# Patient Record
Sex: FEMALE | Race: WHITE | NOT HISPANIC OR LATINO | Employment: UNEMPLOYED | URBAN - METROPOLITAN AREA
[De-identification: names, ages, dates, MRNs, and addresses within clinical notes are randomized per-mention and may not be internally consistent; named-entity substitution may affect disease eponyms.]

---

## 2018-01-12 ENCOUNTER — CONVERSION ENCOUNTER (OUTPATIENT)
Dept: INTERNAL MEDICINE | Facility: CLINIC | Age: 33
End: 2018-01-12

## 2018-01-12 ENCOUNTER — OFFICE VISIT CONVERTED (OUTPATIENT)
Dept: INTERNAL MEDICINE | Facility: CLINIC | Age: 33
End: 2018-01-12
Attending: NURSE PRACTITIONER

## 2018-05-07 ENCOUNTER — OFFICE VISIT CONVERTED (OUTPATIENT)
Dept: INTERNAL MEDICINE | Facility: CLINIC | Age: 33
End: 2018-05-07
Attending: NURSE PRACTITIONER

## 2021-05-16 VITALS
RESPIRATION RATE: 16 BRPM | TEMPERATURE: 98.5 F | DIASTOLIC BLOOD PRESSURE: 68 MMHG | WEIGHT: 263.25 LBS | BODY MASS INDEX: 43.86 KG/M2 | HEART RATE: 94 BPM | OXYGEN SATURATION: 99 % | HEIGHT: 65 IN | SYSTOLIC BLOOD PRESSURE: 128 MMHG

## 2021-05-16 VITALS
HEIGHT: 65 IN | SYSTOLIC BLOOD PRESSURE: 138 MMHG | TEMPERATURE: 97.7 F | DIASTOLIC BLOOD PRESSURE: 90 MMHG | RESPIRATION RATE: 15 BRPM | WEIGHT: 267.37 LBS | HEART RATE: 105 BPM | OXYGEN SATURATION: 98 % | BODY MASS INDEX: 44.55 KG/M2

## 2022-09-08 ENCOUNTER — HOSPITAL ENCOUNTER (EMERGENCY)
Facility: HOSPITAL | Age: 37
Discharge: LEFT BEFORE DISCHARGE | End: 2022-09-08

## 2022-09-08 PROCEDURE — 99211 OFF/OP EST MAY X REQ PHY/QHP: CPT

## 2022-11-14 ENCOUNTER — TRANSCRIBE ORDERS (OUTPATIENT)
Dept: ADMINISTRATIVE | Facility: HOSPITAL | Age: 37
End: 2022-11-14

## 2022-11-14 DIAGNOSIS — G35 MULTIPLE SCLEROSIS: Primary | ICD-10-CM

## 2022-11-15 ENCOUNTER — APPOINTMENT (OUTPATIENT)
Dept: INFUSION THERAPY | Facility: HOSPITAL | Age: 37
End: 2022-11-15

## 2022-11-15 ENCOUNTER — HOSPITAL ENCOUNTER (OUTPATIENT)
Dept: INFUSION THERAPY | Facility: HOSPITAL | Age: 37
Setting detail: INFUSION SERIES
Discharge: HOME | End: 2022-11-15

## 2022-11-15 VITALS
BODY MASS INDEX: 39.37 KG/M2 | HEIGHT: 64 IN | HEART RATE: 91 BPM | DIASTOLIC BLOOD PRESSURE: 109 MMHG | TEMPERATURE: 98.7 F | WEIGHT: 230.6 LBS | OXYGEN SATURATION: 99 % | SYSTOLIC BLOOD PRESSURE: 168 MMHG | RESPIRATION RATE: 16 BRPM

## 2022-11-15 DIAGNOSIS — G35 MULTIPLE SCLEROSIS: Primary | ICD-10-CM

## 2022-11-15 PROCEDURE — 96365 THER/PROPH/DIAG IV INF INIT: CPT

## 2022-11-15 PROCEDURE — 0 METHYLPREDNISOLONE PER 125 MG: Performed by: PSYCHIATRY & NEUROLOGY

## 2022-11-15 RX ADMIN — SODIUM CHLORIDE 1000 MG: 9 INJECTION, SOLUTION INTRAVENOUS at 17:05

## 2022-11-16 ENCOUNTER — APPOINTMENT (OUTPATIENT)
Dept: INFUSION THERAPY | Facility: HOSPITAL | Age: 37
End: 2022-11-16

## 2022-11-16 ENCOUNTER — HOSPITAL ENCOUNTER (OUTPATIENT)
Dept: INFUSION THERAPY | Facility: HOSPITAL | Age: 37
Setting detail: INFUSION SERIES
Discharge: HOME | End: 2022-11-16

## 2022-11-16 VITALS
HEART RATE: 118 BPM | OXYGEN SATURATION: 100 % | SYSTOLIC BLOOD PRESSURE: 171 MMHG | TEMPERATURE: 98.2 F | DIASTOLIC BLOOD PRESSURE: 101 MMHG | RESPIRATION RATE: 20 BRPM

## 2022-11-16 DIAGNOSIS — G35 MULTIPLE SCLEROSIS: Primary | ICD-10-CM

## 2022-11-16 PROCEDURE — 96365 THER/PROPH/DIAG IV INF INIT: CPT

## 2022-11-16 PROCEDURE — 0 METHYLPREDNISOLONE PER 125 MG: Performed by: PSYCHIATRY & NEUROLOGY

## 2022-11-16 RX ADMIN — SODIUM CHLORIDE 1000 MG: 9 INJECTION, SOLUTION INTRAVENOUS at 17:25

## 2022-11-17 ENCOUNTER — APPOINTMENT (OUTPATIENT)
Dept: INFUSION THERAPY | Facility: HOSPITAL | Age: 37
End: 2022-11-17

## 2022-11-17 ENCOUNTER — HOSPITAL ENCOUNTER (OUTPATIENT)
Dept: INFUSION THERAPY | Facility: HOSPITAL | Age: 37
Setting detail: INFUSION SERIES
Discharge: HOME | End: 2022-11-17

## 2022-11-17 VITALS
RESPIRATION RATE: 20 BRPM | HEART RATE: 104 BPM | SYSTOLIC BLOOD PRESSURE: 173 MMHG | OXYGEN SATURATION: 99 % | TEMPERATURE: 98.5 F | DIASTOLIC BLOOD PRESSURE: 104 MMHG

## 2022-11-17 DIAGNOSIS — G35 MULTIPLE SCLEROSIS: Primary | ICD-10-CM

## 2022-11-17 PROCEDURE — 0 METHYLPREDNISOLONE PER 125 MG: Performed by: PSYCHIATRY & NEUROLOGY

## 2022-11-17 PROCEDURE — 96365 THER/PROPH/DIAG IV INF INIT: CPT

## 2022-11-17 RX ADMIN — SODIUM CHLORIDE 1000 MG: 9 INJECTION, SOLUTION INTRAVENOUS at 16:06

## 2022-11-18 ENCOUNTER — HOSPITAL ENCOUNTER (OUTPATIENT)
Dept: INFUSION THERAPY | Facility: HOSPITAL | Age: 37
Setting detail: INFUSION SERIES
Discharge: HOME | End: 2022-11-18

## 2022-11-18 ENCOUNTER — APPOINTMENT (OUTPATIENT)
Dept: INFUSION THERAPY | Facility: HOSPITAL | Age: 37
End: 2022-11-18

## 2022-11-18 DIAGNOSIS — G35 MULTIPLE SCLEROSIS: Primary | ICD-10-CM

## 2022-11-19 ENCOUNTER — HOSPITAL ENCOUNTER (OUTPATIENT)
Dept: INFUSION THERAPY | Facility: HOSPITAL | Age: 37
Setting detail: INFUSION SERIES
Discharge: HOME | End: 2022-11-19

## 2022-11-19 VITALS
TEMPERATURE: 98.7 F | HEART RATE: 114 BPM | SYSTOLIC BLOOD PRESSURE: 184 MMHG | OXYGEN SATURATION: 100 % | RESPIRATION RATE: 20 BRPM | DIASTOLIC BLOOD PRESSURE: 116 MMHG

## 2022-11-19 DIAGNOSIS — G35 MULTIPLE SCLEROSIS: Primary | ICD-10-CM

## 2022-11-19 PROCEDURE — G0463 HOSPITAL OUTPT CLINIC VISIT: HCPCS

## 2022-11-19 PROCEDURE — 0 METHYLPREDNISOLONE PER 125 MG: Performed by: PSYCHIATRY & NEUROLOGY

## 2022-11-19 NOTE — CODE DOCUMENTATION
Patient's IV was infiltrated prior to Solumedrol infusion. Patient did not want to be stuck again and refused the medication.

## 2023-02-10 ENCOUNTER — HOSPITAL ENCOUNTER (EMERGENCY)
Facility: HOSPITAL | Age: 38
Discharge: PSYCHIATRIC OR SUBSTANCE USE FACILITY | DRG: 885 | End: 2023-02-10
Attending: EMERGENCY MEDICINE | Admitting: EMERGENCY MEDICINE
Payer: COMMERCIAL

## 2023-02-10 ENCOUNTER — HOSPITAL ENCOUNTER (INPATIENT)
Facility: HOSPITAL | Age: 38
LOS: 3 days | Discharge: HOME | DRG: 885 | End: 2023-02-13
Attending: PSYCHIATRY & NEUROLOGY | Admitting: PSYCHIATRY & NEUROLOGY
Payer: COMMERCIAL

## 2023-02-10 VITALS
HEIGHT: 64 IN | SYSTOLIC BLOOD PRESSURE: 157 MMHG | TEMPERATURE: 98.2 F | WEIGHT: 229.94 LBS | RESPIRATION RATE: 18 BRPM | DIASTOLIC BLOOD PRESSURE: 110 MMHG | BODY MASS INDEX: 39.26 KG/M2 | OXYGEN SATURATION: 100 % | HEART RATE: 100 BPM

## 2023-02-10 DIAGNOSIS — R45.851 SUICIDAL IDEATION: ICD-10-CM

## 2023-02-10 DIAGNOSIS — F33.1 MODERATE EPISODE OF RECURRENT MAJOR DEPRESSIVE DISORDER: Primary | ICD-10-CM

## 2023-02-10 DIAGNOSIS — F15.10 METHAMPHETAMINE ABUSE: ICD-10-CM

## 2023-02-10 DIAGNOSIS — F19.10 SUBSTANCE ABUSE: ICD-10-CM

## 2023-02-10 LAB
ALBUMIN SERPL-MCNC: 4.4 G/DL (ref 3.5–5.2)
ALBUMIN/GLOB SERPL: 1.3 G/DL
ALP SERPL-CCNC: 78 U/L (ref 39–117)
ALT SERPL W P-5'-P-CCNC: 11 U/L (ref 1–33)
AMPHET+METHAMPHET UR QL: POSITIVE
ANION GAP SERPL CALCULATED.3IONS-SCNC: 11.1 MMOL/L (ref 5–15)
APAP SERPL-MCNC: <5 MCG/ML (ref 0–30)
AST SERPL-CCNC: 12 U/L (ref 1–32)
B-HCG UR QL: NEGATIVE
BARBITURATES UR QL SCN: NEGATIVE
BASOPHILS # BLD AUTO: 0.06 10*3/MM3 (ref 0–0.2)
BASOPHILS NFR BLD AUTO: 0.5 % (ref 0–1.5)
BENZODIAZ UR QL SCN: NEGATIVE
BILIRUB SERPL-MCNC: 0.2 MG/DL (ref 0–1.2)
BUN SERPL-MCNC: 16 MG/DL (ref 6–20)
BUN/CREAT SERPL: 19 (ref 7–25)
CALCIUM SPEC-SCNC: 9.8 MG/DL (ref 8.6–10.5)
CANNABINOIDS SERPL QL: NEGATIVE
CHLORIDE SERPL-SCNC: 104 MMOL/L (ref 98–107)
CO2 SERPL-SCNC: 25.9 MMOL/L (ref 22–29)
COCAINE UR QL: NEGATIVE
CREAT SERPL-MCNC: 0.84 MG/DL (ref 0.57–1)
DEPRECATED RDW RBC AUTO: 39.7 FL (ref 37–54)
EGFRCR SERPLBLD CKD-EPI 2021: 91.3 ML/MIN/1.73
EOSINOPHIL # BLD AUTO: 0.12 10*3/MM3 (ref 0–0.4)
EOSINOPHIL NFR BLD AUTO: 1 % (ref 0.3–6.2)
ERYTHROCYTE [DISTWIDTH] IN BLOOD BY AUTOMATED COUNT: 13.4 % (ref 12.3–15.4)
ETHANOL BLD-MCNC: <10 MG/DL (ref 0–10)
ETHANOL UR QL: <0.01 %
GLOBULIN UR ELPH-MCNC: 3.5 GM/DL
GLUCOSE SERPL-MCNC: 129 MG/DL (ref 65–99)
HCT VFR BLD AUTO: 42.9 % (ref 34–46.6)
HGB BLD-MCNC: 14.2 G/DL (ref 12–15.9)
HOLD SPECIMEN: NORMAL
HOLD SPECIMEN: NORMAL
IMM GRANULOCYTES # BLD AUTO: 0.04 10*3/MM3 (ref 0–0.05)
IMM GRANULOCYTES NFR BLD AUTO: 0.3 % (ref 0–0.5)
LYMPHOCYTES # BLD AUTO: 1.95 10*3/MM3 (ref 0.7–3.1)
LYMPHOCYTES NFR BLD AUTO: 16.6 % (ref 19.6–45.3)
MCH RBC QN AUTO: 27 PG (ref 26.6–33)
MCHC RBC AUTO-ENTMCNC: 33.1 G/DL (ref 31.5–35.7)
MCV RBC AUTO: 81.7 FL (ref 79–97)
METHADONE UR QL SCN: NEGATIVE
MONOCYTES # BLD AUTO: 0.55 10*3/MM3 (ref 0.1–0.9)
MONOCYTES NFR BLD AUTO: 4.7 % (ref 5–12)
NEUTROPHILS NFR BLD AUTO: 76.9 % (ref 42.7–76)
NEUTROPHILS NFR BLD AUTO: 9.01 10*3/MM3 (ref 1.7–7)
NRBC BLD AUTO-RTO: 0 /100 WBC (ref 0–0.2)
OPIATES UR QL: NEGATIVE
OXYCODONE UR QL SCN: NEGATIVE
PLATELET # BLD AUTO: 419 10*3/MM3 (ref 140–450)
PMV BLD AUTO: 9.9 FL (ref 6–12)
POTASSIUM SERPL-SCNC: 3.9 MMOL/L (ref 3.5–5.2)
PROT SERPL-MCNC: 7.9 G/DL (ref 6–8.5)
RBC # BLD AUTO: 5.25 10*6/MM3 (ref 3.77–5.28)
SALICYLATES SERPL-MCNC: <0.3 MG/DL
SODIUM SERPL-SCNC: 141 MMOL/L (ref 136–145)
WBC NRBC COR # BLD: 11.73 10*3/MM3 (ref 3.4–10.8)
WHOLE BLOOD HOLD COAG: NORMAL
WHOLE BLOOD HOLD SPECIMEN: NORMAL

## 2023-02-10 PROCEDURE — 80307 DRUG TEST PRSMV CHEM ANLYZR: CPT | Performed by: EMERGENCY MEDICINE

## 2023-02-10 PROCEDURE — 25010000002 LORAZEPAM PER 2 MG: Performed by: EMERGENCY MEDICINE

## 2023-02-10 PROCEDURE — 82077 ASSAY SPEC XCP UR&BREATH IA: CPT

## 2023-02-10 PROCEDURE — 80179 DRUG ASSAY SALICYLATE: CPT

## 2023-02-10 PROCEDURE — 99284 EMERGENCY DEPT VISIT MOD MDM: CPT

## 2023-02-10 PROCEDURE — 93005 ELECTROCARDIOGRAM TRACING: CPT | Performed by: EMERGENCY MEDICINE

## 2023-02-10 PROCEDURE — 25010000002 DIPHENHYDRAMINE PER 50 MG: Performed by: PSYCHIATRY & NEUROLOGY

## 2023-02-10 PROCEDURE — 25010000002 HALOPERIDOL LACTATE PER 5 MG: Performed by: PSYCHIATRY & NEUROLOGY

## 2023-02-10 PROCEDURE — 80143 DRUG ASSAY ACETAMINOPHEN: CPT

## 2023-02-10 PROCEDURE — 96372 THER/PROPH/DIAG INJ SC/IM: CPT

## 2023-02-10 PROCEDURE — 93010 ELECTROCARDIOGRAM REPORT: CPT | Performed by: INTERNAL MEDICINE

## 2023-02-10 PROCEDURE — 85025 COMPLETE CBC W/AUTO DIFF WBC: CPT

## 2023-02-10 PROCEDURE — 36415 COLL VENOUS BLD VENIPUNCTURE: CPT

## 2023-02-10 PROCEDURE — 81025 URINE PREGNANCY TEST: CPT | Performed by: PSYCHIATRY & NEUROLOGY

## 2023-02-10 PROCEDURE — 80053 COMPREHEN METABOLIC PANEL: CPT

## 2023-02-10 RX ORDER — DIPHENHYDRAMINE HCL 50 MG
50 CAPSULE ORAL EVERY 4 HOURS PRN
Status: DISCONTINUED | OUTPATIENT
Start: 2023-02-10 | End: 2023-02-13 | Stop reason: HOSPADM

## 2023-02-10 RX ORDER — LORAZEPAM 2 MG/1
2 TABLET ORAL EVERY 4 HOURS PRN
Status: DISCONTINUED | OUTPATIENT
Start: 2023-02-10 | End: 2023-02-13 | Stop reason: HOSPADM

## 2023-02-10 RX ORDER — ACETAMINOPHEN 325 MG/1
650 TABLET ORAL EVERY 4 HOURS PRN
Status: DISCONTINUED | OUTPATIENT
Start: 2023-02-10 | End: 2023-02-13 | Stop reason: HOSPADM

## 2023-02-10 RX ORDER — LORAZEPAM 2 MG/ML
2 INJECTION INTRAMUSCULAR ONCE
Status: COMPLETED | OUTPATIENT
Start: 2023-02-10 | End: 2023-02-10

## 2023-02-10 RX ORDER — TRAZODONE HYDROCHLORIDE 50 MG/1
100 TABLET ORAL NIGHTLY PRN
Status: DISCONTINUED | OUTPATIENT
Start: 2023-02-10 | End: 2023-02-13 | Stop reason: HOSPADM

## 2023-02-10 RX ORDER — HALOPERIDOL 5 MG/ML
5 INJECTION INTRAMUSCULAR EVERY 4 HOURS PRN
Status: DISCONTINUED | OUTPATIENT
Start: 2023-02-10 | End: 2023-02-13 | Stop reason: HOSPADM

## 2023-02-10 RX ORDER — HALOPERIDOL 5 MG/ML
INJECTION INTRAMUSCULAR
Status: DISPENSED
Start: 2023-02-10 | End: 2023-02-11

## 2023-02-10 RX ORDER — DIPHENHYDRAMINE HYDROCHLORIDE 50 MG/ML
INJECTION INTRAMUSCULAR; INTRAVENOUS
Status: DISPENSED
Start: 2023-02-10 | End: 2023-02-11

## 2023-02-10 RX ORDER — LORAZEPAM 2 MG/ML
2 INJECTION INTRAMUSCULAR EVERY 4 HOURS PRN
Status: DISCONTINUED | OUTPATIENT
Start: 2023-02-10 | End: 2023-02-13 | Stop reason: HOSPADM

## 2023-02-10 RX ORDER — LOPERAMIDE HYDROCHLORIDE 2 MG/1
2 CAPSULE ORAL
Status: DISCONTINUED | OUTPATIENT
Start: 2023-02-10 | End: 2023-02-13 | Stop reason: HOSPADM

## 2023-02-10 RX ORDER — IBUPROFEN 400 MG/1
400 TABLET ORAL EVERY 6 HOURS PRN
Status: DISCONTINUED | OUTPATIENT
Start: 2023-02-10 | End: 2023-02-13 | Stop reason: HOSPADM

## 2023-02-10 RX ORDER — HALOPERIDOL 5 MG/1
5 TABLET ORAL EVERY 4 HOURS PRN
Status: DISCONTINUED | OUTPATIENT
Start: 2023-02-10 | End: 2023-02-13 | Stop reason: HOSPADM

## 2023-02-10 RX ORDER — DIPHENHYDRAMINE HYDROCHLORIDE 50 MG/ML
50 INJECTION INTRAMUSCULAR; INTRAVENOUS EVERY 4 HOURS PRN
Status: DISCONTINUED | OUTPATIENT
Start: 2023-02-10 | End: 2023-02-13 | Stop reason: HOSPADM

## 2023-02-10 RX ORDER — HYDROXYZINE PAMOATE 50 MG/1
50 CAPSULE ORAL EVERY 6 HOURS PRN
Status: DISCONTINUED | OUTPATIENT
Start: 2023-02-10 | End: 2023-02-13 | Stop reason: HOSPADM

## 2023-02-10 RX ORDER — SODIUM CHLORIDE 0.9 % (FLUSH) 0.9 %
10 SYRINGE (ML) INJECTION AS NEEDED
Status: DISCONTINUED | OUTPATIENT
Start: 2023-02-10 | End: 2023-02-10 | Stop reason: HOSPADM

## 2023-02-10 RX ORDER — NICOTINE 21 MG/24HR
1 PATCH, TRANSDERMAL 24 HOURS TRANSDERMAL
Status: DISCONTINUED | OUTPATIENT
Start: 2023-02-11 | End: 2023-02-13 | Stop reason: HOSPADM

## 2023-02-10 RX ORDER — ALUMINA, MAGNESIA, AND SIMETHICONE 2400; 2400; 240 MG/30ML; MG/30ML; MG/30ML
15 SUSPENSION ORAL EVERY 6 HOURS PRN
Status: DISCONTINUED | OUTPATIENT
Start: 2023-02-10 | End: 2023-02-13 | Stop reason: HOSPADM

## 2023-02-10 RX ADMIN — HALOPERIDOL LACTATE 5 MG: 5 INJECTION, SOLUTION INTRAMUSCULAR at 23:36

## 2023-02-10 RX ADMIN — LORAZEPAM 2 MG: 2 INJECTION INTRAMUSCULAR; INTRAVENOUS at 23:25

## 2023-02-10 RX ADMIN — DIPHENHYDRAMINE HYDROCHLORIDE 50 MG: 50 INJECTION, SOLUTION INTRAMUSCULAR; INTRAVENOUS at 23:36

## 2023-02-10 RX ADMIN — IBUPROFEN 400 MG: 400 TABLET, FILM COATED ORAL at 23:58
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
## 2023-02-10 NOTE — ED NOTES
"Patient family states patient threatened life yesterday and wanted her to get help. Patient won't comply with answering questions. States \"you tell me\" as answer.   "

## 2023-02-11 PROBLEM — F15.90 STIMULANT USE DISORDER: Status: ACTIVE | Noted: 2023-02-11

## 2023-02-11 PROBLEM — F32.A DEPRESSION: Status: ACTIVE | Noted: 2023-02-11

## 2023-02-11 LAB
25(OH)D3 SERPL-MCNC: 16.7 NG/ML (ref 30–100)
VIT B12 BLD-MCNC: 487 PG/ML (ref 211–946)

## 2023-02-11 PROCEDURE — 82607 VITAMIN B-12: CPT | Performed by: PSYCHIATRY & NEUROLOGY

## 2023-02-11 PROCEDURE — 82306 VITAMIN D 25 HYDROXY: CPT | Performed by: PSYCHIATRY & NEUROLOGY

## 2023-02-11 RX ORDER — CHOLECALCIFEROL (VITAMIN D3) 125 MCG
2 CAPSULE ORAL DAILY
COMMUNITY
Start: 2022-11-16

## 2023-02-11 RX ORDER — DULOXETIN HYDROCHLORIDE 30 MG/1
30 CAPSULE, DELAYED RELEASE ORAL DAILY
Status: DISCONTINUED | OUTPATIENT
Start: 2023-02-11 | End: 2023-02-12

## 2023-02-11 RX ORDER — OMEGA-3S/DHA/EPA/FISH OIL/D3 300MG-1000
1 CAPSULE ORAL DAILY
COMMUNITY
Start: 2022-11-16

## 2023-02-11 RX ORDER — CLONIDINE HYDROCHLORIDE 0.1 MG/1
1 TABLET ORAL EVERY 12 HOURS SCHEDULED
COMMUNITY
Start: 2022-11-16 | End: 2023-02-13 | Stop reason: HOSPADM

## 2023-02-11 NOTE — PLAN OF CARE
Notified by RN regarding new consult for hypertension while the patient was in the ED.    Apparently patient was very agitated and overall noncompliant while she was in the emergency department and had some high blood pressure readings while down there.    Patient arrived to [facility], advised to let us know what her blood pressure was when she was settled.  Unfortunately patient continued to be noncompliant and refused vital signs.    Advised that the high blood pressure readings in the emergency department were likely due to her acute agitation.    Advised that if the patient agreed to vitals later this morning and her blood pressure was still elevated then we could be reconsulted for hypertension.       Electronically signed by MI Rivera, 02/11/23, 4:16 AM EST.

## 2023-02-11 NOTE — ED NOTES
Pt uncooperative and threatening to hurt staff and security. Pt asked to change into paper scrubs to go up to [facility]. Pt continues to threaten security and staff. Pt throwing her arms around and refusing to cooperate. Pt placed in restraints at 2315, per physician order, and medicated. A few minutes later pt agreed to cooperate with staff if restraints were removed. Pt restraints removed at 2323. Pt continues to cuss and yell at staff but cooperates.

## 2023-02-11 NOTE — SIGNIFICANT NOTE
"   02/10/23 2053   Plan   Final Discharge Disposition Code 30 - still a patient   Final Note SW spoke with pts friend, of who pt was residing with this earlier this date. Pt reports concerns for pt due to pt texting SI threats day prior in the form of stating \"I am about over life if I could get a knife through my skin I would have already checked out of suffering\". Friend reported that she was fearful for her safety due to pt presenting \"bold\" in her home and acting different than ususal. Pts friend stated that she called [facility] earlier this date and was advised to bring pt here to the ED for help. SW updated provider this date.     Pts friend also stated that pt has significant history of mental illness to include being picked up by police with most recent hospitalization approximately 8 months ago. Pts friend is unaware of where pt was at during that time. Pts friend states that she believes pt is supposed to be on medications but has not been on any medications since residing with her for the past week. Pts friend voiced concerns to SW that pts behavior was not her normal \"self\" and pt was concerned about pts wellbeing.   "

## 2023-02-11 NOTE — PLAN OF CARE
Goal Outcome Evaluation:  Plan of Care Reviewed With: patient  Patient Agreement with Plan of Care: refuses to participate   PATIENT ALERT AND ORIENTED AND MINIMALLY COOPERATIVE WITH STAFF. PATIENT DENIES S/I, H/I OR HALLUCINATIONS. PATIENT REFUSED AM MEDICATIONS AS WELL AS NICOTINE PATCH. PATIENT HAS BEEN WITHDRAWN TO HER ROOM FOR MAJORITY OF SHIFT. WILL CONTINUE TO MONITOR FOR CHANGES IN MOOD OR BEHAVIOR.

## 2023-02-11 NOTE — PLAN OF CARE
"Goal Outcome Evaluation:   Pt arrives to [facility] unit via wheelchair with 3 security at chairside. Pt was searched per [facility] protocol with 2 female staff, and belongings were secured. Per report, a friend brought the patient to the ED for concern of suicidal ideation and depression. Upon arrival, the denies SI. Pt endorses HI towards \"you guys, well, not you in particular, but all the motherfuckers that are doing this to me.\" Pt rates her depression and anxiety \"12\"/10. Pt denies AVH. Pt is not cooperative, and instead is agitated, cursing and threatening staff. Pt was offered PRN PO medication, to which she declined. Pt was given IM PRN medication for agitation. Prior to getting to this unit, the ED administered Ativan 2mg IM just before arrival. Therefore, the patient was only given benadryl and haldol to complete agitation protocol. Pt is uncooperative with most questions and blames staff for her admission to the hospital. Per report from ED, pt is homeless and had her children removed from her care years ago. Pt stated \"I just miss my kids and my .\" When asked where they are, she states \"I don't fucking know, why don't you tell me?\" Pt was given turkey sandwich, sprite and peanut butter crackers as requested. Pt then asked for Ibuprofen for a headache. Pt declined vital signs upon admission. Will continue to monitor this patient and provide a safe environment. -- AS RN  "

## 2023-02-11 NOTE — H&P
" [facility]   PSYCHIATRIC  HISTORY AND PHYSICAL    Patient Name: Corinne Small  : 1985  MRN: [id number]  Primary Care Physician:  Grecia Do APRN  Date of admission: 2/10/2023    Subjective   Subjective     Chief Complaint: \"Im ok. Im too tired to talk to you.\"    HPI:     Corinne Small is a 37 y.o. female admitted to [facility] unit on a 72 hour hold after presenting to ED 2/10/23. Patients family reported that patient threatened her life yesterday and family wanted her to get help. Patient was reportedly verbally aggressive and non cooperative in ED. She reportedly threatened to hurt staff and security and required brief restraints and PRN IM medication for agitation. Per chart review, staff spoke to patients friend who patient had been residing with prior to hospitalization. Friend reported that patient had been making suicidal threats and stating \"I am about over life if I could get a knife through my skin I would have already checked out of suffering.\" Patient described as acting out of character and not at baseline. Friend reportedly brought patient to ED for help.     On arrival to [facility] unit, patient endorsed SI and HI towards people who she felt were causing her to be hospitalized, though she did not name anyone specifically. She denied SI and AVH. She reported depression and anxiety \"12/10.\" She was agitated, threatening and cursing staff and was offered PO medication but declined and instead was administered IM PRN medication for agitation. She was given benadryl and haldol as she had been given ativan in ED. Patient endorsed multiple stressors including homelessness and missing her children who had been removed from her care years ago.     Patient was noted to have HTN in ED and consult was placed to hospitalist. It was thought her elevated bp was due to agitation. Patient would not cooperate with questioning regarding past medical history. She also began to refuse " "vitals.     This morning on my interview, patient isolated to her room, resting in bed. She wakes briefly and states \"im too tired to talk to you.\" but ultimately answers several questions before ending the interview. She denies physical concerns including chest pain, blurry vision, headache, shortness of air, n/v/c/d, radiating pain and all else on review of systems. She reports that she does not feel upset or agitated this morning and states she would like to continue to get rest. She refuses to answer further questions though remains appropriate and without agitation. She states she is open to resuming medication for depression. She endorses depression and anxiety but denies SI/HI/AVH this morning.         Review of Systems:      CONSTITUTIONAL: Feels well denies any acute medical problems  HEENT: No visual problems, no hearing difficulty, no dysphasia,  LUNGS: no cough, no shortness of breath;  CARDIOVASCULAR: no palpitation, no chest pain,   GI: no abdominal pain, no nausea, no vomiting, no diarrhea, no constipation;  LASHONDA: no dysuria, no hematuria, no frequency or urgency,   MUSCULOSKELETAL: no joint pain, no swelling, no stiffness;  ENDOCRINE: no cold or heat intolerance;  HEMATOLOGY: no easy bruising or bleeding,   DERMATOLOGY: no skin rash, no pruritus;  NEUROLOGY: no syncope, no seizures, no numbness or tingling of hands, no   numbness or tingling of feet,   PSYCHIATRIC: As documented in HPI    Personal History     Past Medical History:   Diagnosis Date   • MS (multiple sclerosis) ([facility])    • RA (rheumatoid arthritis) ([facility])    • Stroke ([facility])        Past Surgical History:   Procedure Laterality Date   •  SECTION         Past Psychiatric History: Unable to fully assess, patient refused to participate. She does have history of trials with duloxetine clonidine and wellbutrin. She has been non compliant with medications over the past several months.       Family History: family history includes Heart " disease in her mother and another family member. Unable to fully assess  Family mental health history as patient refused to fully participate.     Social History:     Social History     Socioeconomic History   • Marital status: Single   Tobacco Use   • Smoking status: Every Day     Packs/day: 0.50     Types: Cigarettes   • Smokeless tobacco: Never   Vaping Use   • Vaping Use: Never used   Substance and Sexual Activity   • Alcohol use: Never   • Drug use: Never     Comment: Positive for amph/meth 2/10/23   • Sexual activity: Defer       Substance Abuse History: reports that she has been smoking cigarettes. She has been smoking an average of .5 packs per day. She has never used smokeless tobacco. She reports that she does not drink alcohol and does not use drugs. Patients urine drug screen showed amphetamines present in 2019 and her UDS on admission showed amphetamines/methamphetamines present. Unable to fully assess substance use history as patient refuses to fully participate.    Home Medications:   DULoxetine, buPROPion, cetirizine, cholecalciferol, cloNIDine, and vitamin B-12      Allergies:  Allergies   Allergen Reactions   • Strawberry Extract Unknown - High Severity     Pt uncooperative with question, but noted in previous chart 5/14/2013   • Lisinopril Swelling       Objective   Objective     Vitals:   Temp:  [98.2 °F (36.8 °C)] 98.2 °F (36.8 °C)  Heart Rate:  [100-124] 100  Resp:  [18-22] 22  BP: (157-173)/(106-133) 157/110    Physical Exam: Patient refused so exam limited to observation.        CONSTITUTIONAL: Patient is well developed, well nourished, awake and alert.  HEENT: Head and neck are normocephalic and atraumatic. Pupils equal and  round.  Sclerae clear. No icterus.  SKIN: Clean, dry, intact.  EXTREMITIES: No clubbing, cyanosis, edema noted.  MUSCULOSKELETAL: Symmetric body habitus. Spine straight. Strength intact,  full range of motion.  NEUROLOGIC: Appropriate. No abnormal movements, good muscle  tone.                                Cranial Nerves:  CN III: Pupils symmetric.  CN V: Jaw open and closing normal.  CN VII: Frown and smile symmetric.  CN VIII: Hearing intact.  CN IX, X:  phonation without hoarseness.  CN XII: no dysarthria.    Mental Status Exam:        Hygiene:  Disheveled  Cooperation:  Partially cooperative  Eye Contact: decreased  Psychomotor Behavior:  Psychomotor retardation noted   Affect:  Depressed, constricted in range, congruent to mood   Mood: depressed and anxious  Speech:  Normal rate, depressed tone, low volume   Language: english, fluent   Thought Process: linear and goal directed but difficult to fully assess due to limited participation  Thought Content:  Patient denies SI/HI/AVH this morning, no overt delusional content is noted, does not appear to be responding to internal stimuli  Suicidal:  Denies during assessment, endorsed SI during admission and prior to admission   Homicidal: denies during assessment, endorsed generalized HI during admission  Hallucinations:  Denies during assessment    Delusion:  No overt delusional content noted however limited participation, recently paranoid per chart review  Memory:  Unable to fully assess due to limited participation however appears grossly in tact   Orientation:  Patient responds to her name, is aware she is in hospital and day of week   Reliability:  Limited due to illness and minimal cooperation   Insight:  limited  Judgement: limited based on recent history and minimal cooperation with assessment   Impulse Control:  Limited based on recent history, fair during assessment         Result Review    Result Review:  I have personally reviewed the results from the time of this admission to 2/11/2023 09:28 EST and agree with these findings:  [x]  Laboratory  []  Microbiology  []  Radiology  [x]  EKG/Telemetry   []  Cardiology/Vascular   []  Pathology  []  Old records  []  Other:  Most notable findings include: urine drug screen as  above, elevated white count , alcohol less than 10; EKG showed sinus tach, otherwise normal ecg qt interval 343  Assessment & Plan   Assessment / Plan         Active Hospital Problems:  Active Hospital Problems    Diagnosis    • **Suicidal ideation    • Depression    • Stimulant use disorder    Depressive Disorder, unspecified   Rule out substance induced mood disorder  Rule out PTSD  Stimulant Use Disorder, unknown frequency, unknown severity       Plan:   Admit for safety and stabilization and begin treatment for underlying mood disorder or psychosis with appropriate medications.  Attempt to gain collateral information of possible  Work on safety plan. Cont suicide precautions. Patient currently denying SI/HI/AVH. Cont to assess recent HI, patient has not provided specific names or plan or intent and is currently denying HI. Cont to monitor.   Provide supportive therapy  Patient to engage in all group and individual treatment modalities available including milieu therapy  Work on appropriate disposition follow-up  Estimated length of stay in hospital 4 to 5 days  Patient has not been compliant with medication, history of wellbutrin, duloxetine and clonidine. She is open to resuming a medication for depression. Avoid wellbutrin due to potential for worsening anxiety and increased agitation. Will resume duloxetine 30mg daily for depression and anxiety. The r/b/se/alt of all psychiatric medications were described to patient and patient verbalized understanding.    Monitor response and titrate as indicated and tolerated. Will cont hydroxyzine PRN anxiety.  Patient agitated yesterday, could potentially be due to the stimulant use. Will cont to monitor and can consider adjuncting with abilify if agitation or elevation of mood, however none noted today on my assessment.  Encourage abstinence and utilize motivational interviewing to assess readiness for change. UDS showed stimulant present. Cont to review risks associated  with substance use and benefits of sobriety. Offer chemical dependency treatment referral.   Elevated bp noted in ED, though patient agitated. EKG obtained, showed sinus tachycardia though otherwise WNL. Patient refusing repeat vitals. Hospitalist consulted and per review of chart attributed elevated BP to agitation though recommended entering new consult if remains elevated. Hospitalist did not recommend initiating anti hypertensive at this time. Patient continues to refuse vitals at this time. Cont to encourage. Patient has hx of stroke per chart review as well as history of being on clonidine in past. She has been non compliant with meds prior to hospitalization and states clonidine prescribed for anxiety. She denies chest pain, soa, h/a, n/v/c/d, blurry vision, radiating pain. Cont to monitor.   History of low vitamin d and b12 but patient reports non compliance with meds though has been prescribed vitamin replacement in past. Will obtain b12 and vitamin d level.     Patient encouraged to report any acute change in physical or mental health including thoughts of hurting self or others to staff immediately. Patient verbalized understanding.       DVT prophylaxis:  No DVT prophylaxis order currently exists.    CODE STATUS:    Level Of Support Discussed With: Patient  Code Status (Patient has no pulse and is not breathing): CPR (Attempt to Resuscitate)  Medical Interventions (Patient has pulse or is breathing): Full Support  Release to patient: Routine Release      Admission Status:  I believe this patient meets criteria for inpatient admission.            Electronically signed by Miguel Angel Alonzo MD, 02/11/23, 9:05 AM EST.

## 2023-02-11 NOTE — SIGNIFICANT NOTE
"   02/10/23 2048   Behavior WDL   Behavior WDL interactions;motor movement;X   Interactions threatening;argumentative;hostile;guarded   Motor Movement rigid;agitated   Emotion Mood WDL   Emotion/Mood/Affect WDL affect;emotion mood   Affect tearful;affect consistent with mood   Emotion/Mood angry;irritable   Speech WDL   Speech WDL WDL   Perceptual State WDL   Perceptual State WDL hallucinations;perceptual state;WDL   Hallucinations other (see comments)  (unable to assess; pt not cooperative)   Perceptual State consistent with reality   Thought Process WDL   Thought Process WDL delusions;judgment and insight;thought content;X   Delusions no delusions   Judgment and Insight insight not appropriate to situation;judgment not appropriate to situation   Thought Content helplessness;aggressive   Intellectual Performance WDL   Intellectual Performance WDL intellectual performance;level of consciousness;WDL   Intellectual Performance immediate, recent, and remote memory intact;oriented x 4;able to comprehend   Level of Consciousness Alert   Coping/Stress   Major Change/Loss/Stressor family problems;mental health condition;housing concerns   Patient Personal Strengths assertive   Sources of Support unable to assess   Techniques to [place] with Loss/Stress/Change other (see comments)  (unable to assess)   Reaction to Health Status anger   C-SSRS (Recent)   Q1 Wished to be Dead (Past Month) no  (unable to assess as pt was not cooperative)   Q2 Suicidal Thoughts (Past Month) no   Q6 Suicide Behavior (Lifetime) no   Violence Risk   Feels Like Hurting Others other (see comments)  (unable to assess, pt not cooperative)   Previous Attempt to Harm Others no     SW attempted to meet with pt at bedside this date. Pt was observed to be laying down on her bed and very hostile towards SW entering the room. Pt made statements such as \"go fucking do your job\" and \"leave me the fuck alone\" when SW attempted to engage pt. SW was not able to " "thoroughly assess pt regarding SI/HI this date due to pts aggressive nature. Pt vaguely reported wanted SW to figure out what was going on with her family and the \"fuckers\" that put her here, however, when SW inquired about how to contact them pt state that was SW job to figure out. SW updated provider this date regarding pts agitation and presentation during assessment.   "

## 2023-02-12 PROCEDURE — 25010000002 DIPHENHYDRAMINE PER 50 MG: Performed by: PSYCHIATRY & NEUROLOGY

## 2023-02-12 PROCEDURE — 25010000002 LORAZEPAM PER 2 MG: Performed by: PSYCHIATRY & NEUROLOGY

## 2023-02-12 PROCEDURE — 25010000002 HALOPERIDOL LACTATE PER 5 MG: Performed by: PSYCHIATRY & NEUROLOGY

## 2023-02-12 RX ORDER — DULOXETIN HYDROCHLORIDE 30 MG/1
30 CAPSULE, DELAYED RELEASE ORAL 2 TIMES DAILY
Status: DISCONTINUED | OUTPATIENT
Start: 2023-02-12 | End: 2023-02-12

## 2023-02-12 RX ORDER — DULOXETIN HYDROCHLORIDE 30 MG/1
30 CAPSULE, DELAYED RELEASE ORAL DAILY
Status: DISCONTINUED | OUTPATIENT
Start: 2023-02-12 | End: 2023-02-13 | Stop reason: HOSPADM

## 2023-02-12 RX ORDER — OMEGA-3S/DHA/EPA/FISH OIL/D3 300MG-1000
400 CAPSULE ORAL DAILY
Status: DISCONTINUED | OUTPATIENT
Start: 2023-02-12 | End: 2023-02-13 | Stop reason: HOSPADM

## 2023-02-12 RX ADMIN — LORAZEPAM 2 MG: 2 INJECTION INTRAMUSCULAR; INTRAVENOUS at 19:25

## 2023-02-12 RX ADMIN — HALOPERIDOL LACTATE 5 MG: 5 INJECTION, SOLUTION INTRAMUSCULAR at 19:51

## 2023-02-12 RX ADMIN — DIPHENHYDRAMINE HYDROCHLORIDE 50 MG: 50 INJECTION, SOLUTION INTRAMUSCULAR; INTRAVENOUS at 19:53

## 2023-02-12 RX ADMIN — ACETAMINOPHEN 650 MG: 325 TABLET ORAL at 10:44

## 2023-02-12 NOTE — PLAN OF CARE
Goal Outcome Evaluation:  Plan of Care Reviewed With: patient  Patient Agreement with Plan of Care: refuses to participate         Pt is alert and oriented and able to make needs known.  Pt has been withdrawn to room, aside from making needs known and using the phone.  Pt refused to participate in assessment or group, and refused am meds.  Pt remains labile, often tearful and then hostile with staff.  Pt is currently resting in her bed with no s/s of acute distress observed at this time.

## 2023-02-12 NOTE — PROGRESS NOTES
" [facility]     Psychiatric Progress Note    Patient Name: Corinne Theodore [facility]  : 1985  MRN: [id number]  Primary Care Physician:  Grecia Do APRN  Date of admission: 2/10/2023    Subjective   Subjective     Chief Complaint: \"Im doing terrible, how does it look like im doing? I miss my kids.\"    Patient seen, chart reviewed, case discussed with staff.     Nursing: patient has been irritable, minimally cooperative with care, initially refused vitals but later allowed. No participating in groups/therapy. Denies si/hi/avh. Rated anxiety and depression 10/10. Contracts for safety. States \"want to be left alone.\" Isolative and withdrawn, dismissive. Refusing medication.     Interval history: patient isolated to room this morning. She is irritable on approach. She makes several inappropriate comments, curses, and refuses to fully engage in interview. She states she misses her children but refuses to elaborate on where her kids are or why she has been unable to see them. She is frustrated about being in the hospital. She states that she is angry with the \"people that keep putting me in here.\" She denies si/hi/avh. Reports depression and anxiety. Reports that she \"feels miserable\" in terms of mood but denies any physical concerns. She states she wants to be left alone and ends interview early. She reports no other concerns, states she does not plan to attend groups or therapeutic activities today. She refuses to engage in conversation regarding medication. Continues to indicate that she will not fully participte in care, stating \"leave me alone, can you get me something to drink since you woke me up?\"    Objective   Objective     Vitals:   Temp:  [97.6 °F (36.4 °C)-98.4 °F (36.9 °C)] 98.4 °F (36.9 °C)  Heart Rate:  [80-96] 96  Resp:  [16-18] 18  BP: (145-154)/(86-99) 154/99        Mental Status Exam:      Appearance:   Disheveled, poor self care, overweight  Reliability:   poor  Eye Contact:   " "Poor  Concentration/Focus:    distractable/distractable  Behaviors:    Psychomotor slowing noted, verbally aggressive   Memory :    Grossly in tact , sensorium in tact   Speech:    Normal rate, irritable tone, normal volume  Language:   English, fluent, non accented, cursing   Mood :    \"miserable\"  Affect:    Depressed, irritable, guarded   Thought process:    Linear, goal directed  Thought Content:    Denies SI/HI/AVH. No delusional content noted. Does not appear to be responding to internal stimuli.   Insight:   poor  Judgement:  Poor    Review of Systems:     A complete 14 point review of systems was completed and all systems were negative unless otherwise noted in HPI above.     Result Review    Result Review:  I have personally reviewed the results from the time of this admission to 2/12/2023 09:16 EST and agree with these findings:  [x]  Laboratory  []  Microbiology  []  Radiology  []  EKG/Telemetry   []  Cardiology/Vascular   []  Pathology  []  Old records  []  Other:  Most notable findings include: b12 normal, vitamin d low     Medications:   !Patient Home Medications Stored in Pharmacy, , Does not apply, BID  DULoxetine, 30 mg, Oral, Daily  nicotine, 1 patch, Transdermal, Q24H        Assessment & Plan   Assessment / Plan       Active Hospital Problems:  Active Hospital Problems    Diagnosis    • **Suicidal ideation    • Depression    • Stimulant use disorder        Plan:        -Continue inpatient admission for safety and stabilization.   -Continue to encourage active participation in all groups and therapeutic activities offered.   -Continue to encourage compliance with all treatment recommendations  Including medications. Will continue duloxetine at current dose as patient has not yet complied.  -patient noted to have low vitamin d, b12 wnl, will resume patients home dose of vitamin D.   -Patient is on a 72 HH. Reassess legal status daily.   -Follow up daily.  -supportive approach.   -Continue suicide " precautions along with q15 minute checks. Pt denies si/hi/avh, contracts for safety, identifies children as reasons to live, and future orientation in wanting to see her children. Risks include hopelessness, non compliance with care and frustration with support system.   -Reviewed r/b/se/alt of all psychiatric medications and patient verbalized understanding and desire to cont treatment.  -social work to engage in disposition planning.  -suicide risk assessment to be completed prior to discharge.   -Labs: Labs reviewed above. All new lab results were reviewed with patient and patient was given opportunity to ask questions.      Patient educated should she develop any dangerous thoughts including thoughts of hurting self or others or experience any acute change in physical or mental health, she should immediately alert staff. Patient verbalized understanding.     Disposition:  I expect patient to be discharged in 5-7 days or per primary team.    Electronically signed by Miguel Angel Alonzo MD, 02/12/23, 9:16 AM EST.

## 2023-02-12 NOTE — PLAN OF CARE
"Goal Outcome Evaluation:  Plan of Care Reviewed With: patient  Patient Agreement with Plan of Care: refuses to participate     Progress: no change       Patient uncooperative with RN assessment. Eventually rated both anxiety and depression 10/10. When asked about HI/SI, patient raised voiced and said \"Not at the fucking moment.\" Denies AVH. Contracts for safety. Mood very irritable. States \"I don't want to be bothered, I want to be left alone.\" Allowed staff to take vital signs after refusing at beginning of shift. Remains withdrawn to room and dismissive to staff. Able to make needs known. Will continue plan of care and provide a safe patient environment.   "

## 2023-02-12 NOTE — NURSING NOTE
"Pt required rousing verbally and with light touch for primary RN to complete assessment at approx 1015.  When assessed anxiety and depression pt states, \"I'm pissed off.\"  When assessed for SI or HI pt states, \"I'm pissed off.\"  When assessed for her ability to contract for safety pt states, \"I'm pissed off because I want to see my fucking kids.\"  Primary RN educated pt that if she is unable to contract for safety or participate in treatment plan she may require a close watch sitter.  Pt stated, \"I am cooperating, dumb cunt.\"       Pr Dr. Alonzo's progress note this morning pt denies SI, HI, or a/v/h.  Close was does not appear to be necessary at this this point.  Will continue to monitor and provide for safety.   "

## 2023-02-13 VITALS
RESPIRATION RATE: 20 BRPM | SYSTOLIC BLOOD PRESSURE: 162 MMHG | TEMPERATURE: 97.7 F | WEIGHT: 229 LBS | OXYGEN SATURATION: 100 % | HEIGHT: 64 IN | BODY MASS INDEX: 39.09 KG/M2 | HEART RATE: 60 BPM | DIASTOLIC BLOOD PRESSURE: 76 MMHG

## 2023-02-13 PROBLEM — E55.9 VITAMIN D DEFICIENCY: Status: ACTIVE | Noted: 2023-02-13

## 2023-02-13 PROBLEM — F15.90 AMPHETAMINE MISUSE: Status: ACTIVE | Noted: 2023-02-13

## 2023-02-13 PROBLEM — J30.9 ALLERGIC RHINITIS: Status: ACTIVE | Noted: 2023-02-13

## 2023-02-13 PROBLEM — I10 HTN (HYPERTENSION): Status: ACTIVE | Noted: 2023-02-13

## 2023-02-13 PROBLEM — F33.1 MAJOR DEPRESSIVE DISORDER, RECURRENT EPISODE, MODERATE (HCC): Status: ACTIVE | Noted: 2023-02-13

## 2023-02-13 RX ORDER — DULOXETIN HYDROCHLORIDE 30 MG/1
30 CAPSULE, DELAYED RELEASE ORAL DAILY
Qty: 30 CAPSULE | Refills: 1 | Status: SHIPPED | OUTPATIENT
Start: 2023-02-13

## 2023-02-13 RX ADMIN — DULOXETINE HYDROCHLORIDE 30 MG: 30 CAPSULE, DELAYED RELEASE ORAL at 09:40

## 2023-02-13 RX ADMIN — CHOLECALCIFEROL (VITAMIN D3) 10 MCG (400 UNIT) TABLET 400 UNITS: at 09:40

## 2023-02-13 NOTE — NURSING NOTE
Patient in room sitting on bed, awake, calmer with irritable affect.  Patient is minimally cooperative with assessment and debriefing.  Pt. Reports she is still very angry, states no one is listening to her, and staff is siding with her family against her. Patient states she does not want to be here, so will not be cooperative with anything, and will not ever take medication.  Patient makes no eye contact and has defiant body language. Emotional support offered and refused. Safe environment provided.

## 2023-02-13 NOTE — NURSING NOTE
Patient approached nursing station door during shift reports and demanded her shirts, when redirected to wait for report to be over, patient began to get angry and irritable, yelling at MHTs, and cursing at staff, disrupting unit.

## 2023-02-13 NOTE — NURSING NOTE
"Patient standing at nurses station door, screaming, stating she is having a \"panic attack\" due to not getting her shirts back.  Patient demanding to leave, observably angry and agitated.  Patient refused vital signs, refused to process emotions and was unresponsive to deescalation from staff. Patient is tearful, yelling and cursing at staff stating if she does not get what she wants, she is leaving and states she will bust out a window and leave.  Patient slapped the nursing station door with her open palm several times. Patient ranting at staff stating that staff is \"in on it with my family to keep my kids away and put me away somewhere.\"  Patient is very argumentative with any emotional support attempted at this time. Oral medications for agitation were offered and patient refused.   "

## 2023-02-13 NOTE — PLAN OF CARE
Goal Outcome Evaluation:  Plan of Care Reviewed With: patient  Patient Agreement with Plan of Care: agrees         Pt. Is quiet and withdrawn today with some irritability and sarcasm. Pt. Is denying si/hi/avh and contracts for safety. Pt. Was cooperative with medication this morning and is focused on being released today.

## 2023-02-13 NOTE — NURSING NOTE
"Security notified for their assistance to maintain safe environment.  Patient was directed to return to her room, and although reluctant and refusing, patient did so on her own.  Patient directed by nurse to lie down on stomach and patient was cooperative with this. IM agitation medication was administered utilizing a light hold to upper torso, by 2 security officers with others standing by.  Injection was tolerated well, hold was released and patient was encouraged to rest in room for awhile.  Patient stated she was angry and \"I want my shit!\" continued cursing at staff and proceeded to take off her scrub top and place her blanket across her,  Patient instructed not to come out in hallway without her top on.   "

## 2023-02-13 NOTE — PLAN OF CARE
"Goal Outcome Evaluation:  Plan of Care Reviewed With: patient  Patient Agreement with Plan of Care: disagrees (describe) (States family is trying to ruin her life [facility]     Progress: no change.      Patient has been resting/sleeping in room from 2200 to present.  Patient was only minimally cooperative with pm assessments once calmness was achieved. Patient denies current S/I, will not answer H/I only stares at this nurse, and denies depression, and A/V hallucinations. Patient maintains that she is angry and feels that staff here is in collaboration with her family to keep her \"locked up\".  Attempted to discuss the suicidal type posts she had made in the presence of her friend that lead to this hospitalization, but patient resistant.  Patient states her friend made her come in, but she did not want to be inpatient.  Patient states she does not want aftercare or medication and states will not be cooperative here.  Patient encouraged to discuss her concerns with the doctor tomorrow regarding discharge.  Patient was given back her leggings, shirt and wirefree soft cup bra to establish trust and rapport. All items are allowed and not on contraband list. Will continue to offer emotional support and provide a safe environment.       "

## 2023-02-13 NOTE — DISCHARGE SUMMARY
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         Hospital Course     Hospital Course:  Corinne mSall is a 37 y.o. female admitted on a 72-hour hold with a history of depression and reported suicidal ideations.  Patient has been uncooperative throughout her stay.  She has had periods of being agitated requiring agitation protocol.  Patient is been noncompliant with milieu therapy and treatment.  She has been refusing interventions from therapist and staff.    Patient is continually questioning why she is here in the hospital.  She feels that she does not need to be hospitalized.  She been denying suicidal ideation throughout her stay.  She has been compliant with taking duloxetine for her depression.  The patient has continually made complaints of being upset because she is unable to see her children, however she cannot state when she saw him last, why they were taken away from her, or who may have them or has custody of them.  This appears to be an ongoing problem.  She is uncooperative states that she is not very participate in any care.  She has been taking her medications.  She continues to state that she does not need this level of care and would  like to go home today.  Reports that her friends thought she needed to come into the hospital and she did not agree but she came to appease them.  She consistently denies suicidal ideation and has since she came into the hospital.  She continues states she does not care to be compliant with treatment, groups, or any therapeutic interactions.  She is not receiving much benefit from being in the hospital.    Toxicology was positive for amphetamines continues to refuse to discuss substance use and the possibility go to drug and alcohol rehabilitation.  Suspect that her presentation and demeanor have a lot to do with substance use and possible crash from methamphetamine.  States that she does not need drug and alcohol treatment and is refusing referral for treatment.      Date of discharge participates in exam but begrudgingly.  There is no acute agitation or restlessness.  She is future and goal directed.  No acute anxiety or agitation.  Speech is articulate, fluent, normal rate and volume.  Language is appropriate and relevant and little harsh at times.  Thought process are linear.  Thought content is negative for suicidal or homicidal ideation denies hallucinations and none evident.  Insight and judgment are limited        DISCHARGE Follow Up Recommendations for labs and diagnostics: Follow-up with primary care as needed, community mental health, drug and alcohol rehabilitation      Day of Discharge     Vital Signs:  Temp:  [97.7 °F (36.5 °C)-98.1 °F (36.7 °C)] 97.7 °F (36.5 °C)  Heart Rate:  [60-89] 60  Resp:  [18-20] 20  BP: (148-162)/(76-91) 162/76      Pertinent  and/or Most Recent Results     LAB RESULTS:      Lab 02/10/23  1635   WBC 11.73*   HEMOGLOBIN 14.2   HEMATOCRIT 42.9   PLATELETS 419   NEUTROS ABS 9.01*   IMMATURE GRANS (ABS) 0.04   LYMPHS ABS 1.95   MONOS ABS 0.55   EOS ABS 0.12   MCV 81.7         Lab 02/10/23  1635   SODIUM 141   POTASSIUM 3.9   CHLORIDE 104   CO2 25.9   ANION GAP 11.1   BUN 16    CREATININE 0.84   EGFR 91.3   GLUCOSE 129*   CALCIUM 9.8         Lab 02/10/23  1635   TOTAL PROTEIN 7.9   ALBUMIN 4.4   GLOBULIN 3.5   ALT (SGPT) 11   AST (SGOT) 12   BILIRUBIN 0.2   ALK PHOS 78                 Lab 02/11/23  0936   VITAMIN B 12 487                         Lab 02/10/23  1635   ETHANOL PCT <0.010   ETHANOL MGDL <10         Lab 02/10/23  1856   AMPH/METHAM SCREEN, URINE Positive*   BENZODIAZEPINE SCREEN, URINE Negative   COCAINE SCREEN, URINE Negative   OPIATES Negative   THC URINE SCREEN Negative   METHADONE SCREEN, URINE Negative     Brief Urine Lab Results  (Last result in the past 365 days)      Color   Clarity   Blood   Leuk Est   Nitrite   Protein   CREAT   Urine HCG        02/10/23 1856               Negative                                 Imaging Results (Last 7 Days)     ** No results found for the last 168 hours. **           Labs Pending at Discharge:           Discharge Details        Discharge Medications      Changes to Medications      Instructions Start Date   DULoxetine 30 MG capsule  Commonly known as: CYMBALTA  What changed:   · how much to take  · how to take this  · when to take this   30 mg, Oral, Daily         Continue These Medications      Instructions Start Date   cetirizine 10 MG tablet  Commonly known as: zyrTEC   10 mg, Oral, Daily      cholecalciferol 10 MCG (400 UNIT) tablet  Commonly known as: VITAMIN D3   1 tablet, Oral, Daily      vitamin B-12 500 MCG tablet  Commonly known as: CYANOCOBALAMIN   2 tablets, Oral, Daily         Stop These Medications    buPROPion 75 MG tablet  Commonly known as: WELLBUTRIN     cloNIDine 0.1 MG tablet  Commonly known as: CATAPRES            Allergies   Allergen Reactions   • Strawberry Extract Unknown - High Severity     Pt uncooperative with question, but noted in previous chart 5/14/2013   • Lisinopril Swelling         Discharge Disposition:      Diet:  Hospital:  Diet Order   Procedures   • Diet: Regular/House Diet; Texture: Regular  Texture (IDDSI 7); Fluid Consistency: Thin (IDDSI 0)         Discharge Activity:   Activity Instructions     Activity as Tolerated            Discharge Condition: Stable    CODE STATUS:  Code Status and Medical Interventions:   Ordered at: 02/11/23 0902     Level Of Support Discussed With:    Patient     Code Status (Patient has no pulse and is not breathing):    CPR (Attempt to Resuscitate)     Medical Interventions (Patient has pulse or is breathing):    Full Support     Release to patient:    Routine Release         No future appointments.    Additional Instructions for the Follow-ups that You Need to Schedule     Discharge Follow-up with PCP   As directed       Currently Documented PCP:    Grecia Do APRN    PCP Phone Number:    [phone]     Follow Up Details: As needed         Discharge Follow-up with Specified Provider: [facility]   As directed      To: [facility]               Time spent on Discharge including face to face service: 30 minutes    Part of this note may be an electronic transcription/translation of spoken language to printed text using the Dragon dictation system.        Electronically signed by Ramses Goldman MD, 02/13/23, 10:42 AM EST.

## 2023-02-26 LAB — QT INTERVAL: 343 MS

## 2023-09-09 ENCOUNTER — APPOINTMENT (OUTPATIENT)
Dept: CT IMAGING | Facility: HOSPITAL | Age: 38
End: 2023-09-09
Payer: MEDICAID

## 2023-09-09 ENCOUNTER — HOSPITAL ENCOUNTER (EMERGENCY)
Facility: HOSPITAL | Age: 38
Discharge: HOME | End: 2023-09-09
Attending: EMERGENCY MEDICINE
Payer: MEDICAID

## 2023-09-09 ENCOUNTER — APPOINTMENT (OUTPATIENT)
Dept: GENERAL RADIOLOGY | Facility: HOSPITAL | Age: 38
End: 2023-09-09
Payer: MEDICAID

## 2023-09-09 ENCOUNTER — APPOINTMENT (OUTPATIENT)
Dept: MRI IMAGING | Facility: HOSPITAL | Age: 38
End: 2023-09-09
Payer: COMMERCIAL

## 2023-09-09 VITALS
HEIGHT: 64 IN | WEIGHT: 224.87 LBS | RESPIRATION RATE: 18 BRPM | OXYGEN SATURATION: 98 % | DIASTOLIC BLOOD PRESSURE: 97 MMHG | SYSTOLIC BLOOD PRESSURE: 157 MMHG | TEMPERATURE: 98.5 F | BODY MASS INDEX: 38.39 KG/M2 | HEART RATE: 68 BPM

## 2023-09-09 DIAGNOSIS — G44.209 ACUTE NON INTRACTABLE TENSION-TYPE HEADACHE: ICD-10-CM

## 2023-09-09 DIAGNOSIS — G35 MULTIPLE SCLEROSIS EXACERBATION: ICD-10-CM

## 2023-09-09 DIAGNOSIS — H54.61 VISION LOSS, RIGHT EYE: Primary | ICD-10-CM

## 2023-09-09 LAB
ALBUMIN SERPL-MCNC: 4.3 G/DL (ref 3.5–5.2)
ALBUMIN/GLOB SERPL: 1.6 G/DL
ALP SERPL-CCNC: 64 U/L (ref 39–117)
ALT SERPL W P-5'-P-CCNC: 15 U/L (ref 1–33)
ANION GAP SERPL CALCULATED.3IONS-SCNC: 9 MMOL/L (ref 5–15)
APTT PPP: 28.1 SECONDS (ref 24.2–34.2)
AST SERPL-CCNC: 14 U/L (ref 1–32)
BASOPHILS # BLD AUTO: 0.02 10*3/MM3 (ref 0–0.2)
BASOPHILS NFR BLD AUTO: 0.3 % (ref 0–1.5)
BILIRUB SERPL-MCNC: 0.3 MG/DL (ref 0–1.2)
BILIRUB UR QL STRIP: NEGATIVE
BUN SERPL-MCNC: 11 MG/DL (ref 6–20)
BUN/CREAT SERPL: 14.1 (ref 7–25)
CALCIUM SPEC-SCNC: 9.6 MG/DL (ref 8.6–10.5)
CHLORIDE SERPL-SCNC: 104 MMOL/L (ref 98–107)
CLARITY UR: CLEAR
CO2 SERPL-SCNC: 28 MMOL/L (ref 22–29)
COLOR UR: YELLOW
CREAT SERPL-MCNC: 0.78 MG/DL (ref 0.57–1)
DEPRECATED RDW RBC AUTO: 40.3 FL (ref 37–54)
EGFRCR SERPLBLD CKD-EPI 2021: 99.2 ML/MIN/1.73
EOSINOPHIL # BLD AUTO: 0.17 10*3/MM3 (ref 0–0.4)
EOSINOPHIL NFR BLD AUTO: 2.5 % (ref 0.3–6.2)
ERYTHROCYTE [DISTWIDTH] IN BLOOD BY AUTOMATED COUNT: 13.9 % (ref 12.3–15.4)
GLOBULIN UR ELPH-MCNC: 2.7 GM/DL
GLUCOSE BLDC GLUCOMTR-MCNC: 100 MG/DL (ref 70–99)
GLUCOSE SERPL-MCNC: 100 MG/DL (ref 65–99)
GLUCOSE UR STRIP-MCNC: NEGATIVE MG/DL
HCT VFR BLD AUTO: 38 % (ref 34–46.6)
HGB BLD-MCNC: 12.9 G/DL (ref 12–15.9)
HGB UR QL STRIP.AUTO: NEGATIVE
HOLD SPECIMEN: NORMAL
HOLD SPECIMEN: NORMAL
IMM GRANULOCYTES # BLD AUTO: 0.03 10*3/MM3 (ref 0–0.05)
IMM GRANULOCYTES NFR BLD AUTO: 0.4 % (ref 0–0.5)
INR PPP: 0.98 (ref 0.86–1.15)
KETONES UR QL STRIP: NEGATIVE
LEUKOCYTE ESTERASE UR QL STRIP.AUTO: NEGATIVE
LYMPHOCYTES # BLD AUTO: 1.4 10*3/MM3 (ref 0.7–3.1)
LYMPHOCYTES NFR BLD AUTO: 20.3 % (ref 19.6–45.3)
MCH RBC QN AUTO: 27.5 PG (ref 26.6–33)
MCHC RBC AUTO-ENTMCNC: 33.9 G/DL (ref 31.5–35.7)
MCV RBC AUTO: 81 FL (ref 79–97)
MONOCYTES # BLD AUTO: 0.57 10*3/MM3 (ref 0.1–0.9)
MONOCYTES NFR BLD AUTO: 8.3 % (ref 5–12)
NEUTROPHILS NFR BLD AUTO: 4.69 10*3/MM3 (ref 1.7–7)
NEUTROPHILS NFR BLD AUTO: 68.2 % (ref 42.7–76)
NITRITE UR QL STRIP: NEGATIVE
NRBC BLD AUTO-RTO: 0 /100 WBC (ref 0–0.2)
PH UR STRIP.AUTO: 8 [PH] (ref 5–8)
PLATELET # BLD AUTO: 324 10*3/MM3 (ref 140–450)
PMV BLD AUTO: 9.9 FL (ref 6–12)
POTASSIUM SERPL-SCNC: 4.9 MMOL/L (ref 3.5–5.2)
PROT SERPL-MCNC: 7 G/DL (ref 6–8.5)
PROT UR QL STRIP: NEGATIVE
PROTHROMBIN TIME: 13.1 SECONDS (ref 11.8–14.9)
RBC # BLD AUTO: 4.69 10*6/MM3 (ref 3.77–5.28)
SODIUM SERPL-SCNC: 141 MMOL/L (ref 136–145)
SP GR UR STRIP: >1.03 (ref 1–1.03)
TROPONIN T SERPL HS-MCNC: <6 NG/L
UROBILINOGEN UR QL STRIP: ABNORMAL
WBC NRBC COR # BLD: 6.88 10*3/MM3 (ref 3.4–10.8)
WHOLE BLOOD HOLD COAG: NORMAL
WHOLE BLOOD HOLD SPECIMEN: NORMAL

## 2023-09-09 PROCEDURE — 96375 TX/PRO/DX INJ NEW DRUG ADDON: CPT

## 2023-09-09 PROCEDURE — 25010000002 LORAZEPAM PER 2 MG: Performed by: EMERGENCY MEDICINE

## 2023-09-09 PROCEDURE — 82948 REAGENT STRIP/BLOOD GLUCOSE: CPT

## 2023-09-09 PROCEDURE — 70496 CT ANGIOGRAPHY HEAD: CPT

## 2023-09-09 PROCEDURE — 84484 ASSAY OF TROPONIN QUANT: CPT | Performed by: EMERGENCY MEDICINE

## 2023-09-09 PROCEDURE — 25010000002 KETOROLAC TROMETHAMINE PER 15 MG: Performed by: EMERGENCY MEDICINE

## 2023-09-09 PROCEDURE — 96374 THER/PROPH/DIAG INJ IV PUSH: CPT

## 2023-09-09 PROCEDURE — 36415 COLL VENOUS BLD VENIPUNCTURE: CPT

## 2023-09-09 PROCEDURE — 70551 MRI BRAIN STEM W/O DYE: CPT

## 2023-09-09 PROCEDURE — 99285 EMERGENCY DEPT VISIT HI MDM: CPT

## 2023-09-09 PROCEDURE — 85730 THROMBOPLASTIN TIME PARTIAL: CPT | Performed by: EMERGENCY MEDICINE

## 2023-09-09 PROCEDURE — 85025 COMPLETE CBC W/AUTO DIFF WBC: CPT | Performed by: EMERGENCY MEDICINE

## 2023-09-09 PROCEDURE — 85610 PROTHROMBIN TIME: CPT | Performed by: EMERGENCY MEDICINE

## 2023-09-09 PROCEDURE — 71045 X-RAY EXAM CHEST 1 VIEW: CPT

## 2023-09-09 PROCEDURE — 93005 ELECTROCARDIOGRAM TRACING: CPT

## 2023-09-09 PROCEDURE — 0042T HC CT CEREBRAL PERFUSION W/WO CONTRAST: CPT

## 2023-09-09 PROCEDURE — 70450 CT HEAD/BRAIN W/O DYE: CPT

## 2023-09-09 PROCEDURE — 81003 URINALYSIS AUTO W/O SCOPE: CPT | Performed by: EMERGENCY MEDICINE

## 2023-09-09 PROCEDURE — 93005 ELECTROCARDIOGRAM TRACING: CPT | Performed by: EMERGENCY MEDICINE

## 2023-09-09 PROCEDURE — 80053 COMPREHEN METABOLIC PANEL: CPT | Performed by: EMERGENCY MEDICINE

## 2023-09-09 PROCEDURE — 70498 CT ANGIOGRAPHY NECK: CPT

## 2023-09-09 PROCEDURE — 25510000001 IOPAMIDOL PER 1 ML

## 2023-09-09 RX ORDER — CLONIDINE HYDROCHLORIDE 0.1 MG/1
0.1 TABLET ORAL 2 TIMES DAILY
COMMUNITY

## 2023-09-09 RX ORDER — SODIUM CHLORIDE 0.9 % (FLUSH) 0.9 %
10 SYRINGE (ML) INJECTION AS NEEDED
Status: DISCONTINUED | OUTPATIENT
Start: 2023-09-09 | End: 2023-09-09 | Stop reason: HOSPADM

## 2023-09-09 RX ORDER — BUPROPION HYDROCHLORIDE 100 MG/1
TABLET, EXTENDED RELEASE ORAL
COMMUNITY
End: 2023-09-09

## 2023-09-09 RX ORDER — KETOROLAC TROMETHAMINE 10 MG/1
10 TABLET, FILM COATED ORAL EVERY 6 HOURS PRN
Qty: 12 TABLET | Refills: 0 | Status: SHIPPED | OUTPATIENT
Start: 2023-09-09

## 2023-09-09 RX ORDER — ASPIRIN 81 MG/1
81 TABLET, CHEWABLE ORAL DAILY
COMMUNITY

## 2023-09-09 RX ORDER — IBUPROFEN 200 MG
200 TABLET ORAL EVERY 6 HOURS PRN
COMMUNITY

## 2023-09-09 RX ORDER — LORAZEPAM 2 MG/ML
1 INJECTION INTRAMUSCULAR ONCE
Status: COMPLETED | OUTPATIENT
Start: 2023-09-09 | End: 2023-09-09

## 2023-09-09 RX ORDER — RISPERIDONE 1 MG/1
1 TABLET ORAL 2 TIMES DAILY
COMMUNITY

## 2023-09-09 RX ORDER — KETOROLAC TROMETHAMINE 30 MG/ML
30 INJECTION, SOLUTION INTRAMUSCULAR; INTRAVENOUS ONCE
Status: COMPLETED | OUTPATIENT
Start: 2023-09-09 | End: 2023-09-09

## 2023-09-09 RX ORDER — FLUOXETINE HYDROCHLORIDE 20 MG/1
20 CAPSULE ORAL DAILY
COMMUNITY

## 2023-09-09 RX ORDER — BUPROPION HYDROCHLORIDE 150 MG/1
150 TABLET ORAL DAILY
COMMUNITY

## 2023-09-09 RX ADMIN — KETOROLAC TROMETHAMINE 30 MG: 30 INJECTION, SOLUTION INTRAMUSCULAR; INTRAVENOUS at 13:51

## 2023-09-09 RX ADMIN — SODIUM CHLORIDE 1000 ML: 9 INJECTION, SOLUTION INTRAVENOUS at 13:50

## 2023-09-09 RX ADMIN — LORAZEPAM 1 MG: 2 INJECTION INTRAMUSCULAR; INTRAVENOUS at 13:51

## 2023-09-09 RX ADMIN — IOPAMIDOL 150 ML: 755 INJECTION, SOLUTION INTRAVENOUS at 12:27

## 2023-09-09 NOTE — ED PROVIDER NOTES
"Time: 12:57 PM EDT  Date of encounter:  2023  Independent Historian/Clinical History and Information was obtained by:   Patient    History is limited by: N/A    Chief Complaint: Headache, loss of vision in right eye this morning      History of Present Illness:  Patient is a 38 y.o. year old female with history of MS and prior stroke, who presents to the emergency department for evaluation of intermittent loss of vision in right eye and headache this morning at 9:30 AM.    She states she has had issues with her vision previously in the right eye but this morning it has not \"come back to normal\" and she is just seeing bright light only but definitely decreased vision in the right eye only.    She also has numbness of the right side of the body and sharp pains in her head.    She has had some migraine headaches before but no neurologic symptoms.    She was made a stroke alert on arrival.        HPI    Patient Care Team  Primary Care Provider: Grecia Do APRN    Past Medical History:     Allergies   Allergen Reactions    Strawberry Extract Unknown - High Severity     Pt uncooperative with question, but noted in previous chart 2013    Lisinopril Swelling     Past Medical History:   Diagnosis Date    Anxiety     MS (multiple sclerosis)     RA (rheumatoid arthritis)     Stroke      Past Surgical History:   Procedure Laterality Date     SECTION       Family History   Problem Relation Age of Onset    Heart disease Mother     Heart disease Other        Home Medications:  Prior to Admission medications    Medication Sig Start Date End Date Taking? Authorizing Provider   aspirin 81 MG chewable tablet Chew 1 tablet Daily.   Yes Yvonne Nuno MD   buPROPion XL (WELLBUTRIN XL) 150 MG 24 hr tablet Take 1 tablet by mouth Daily.   Yes Yvonne Nuno MD   cloNIDine (CATAPRES) 0.1 MG tablet Take 1 tablet by mouth 2 (Two) Times a Day.   Yes Yvonne Nuno MD   FLUoxetine (PROzac) 20 MG " "capsule Take 1 capsule by mouth Daily.   Yes Yvonne Nuno MD   ibuprofen (ADVIL,MOTRIN) 200 MG tablet Take 1 tablet by mouth Every 6 (Six) Hours As Needed for Mild Pain.   Yes Yvonen Nuno MD   risperiDONE (risperDAL) 1 MG tablet Take 1 tablet by mouth 2 (Two) Times a Day.   Yes Yvonne Nuno MD   buPROPion SR (WELLBUTRIN SR) 100 MG 12 hr tablet   9/9/23  Yvonne Nuno MD   cetirizine (zyrTEC) 10 MG tablet Take 1 tablet by mouth Daily. 10/22/22 9/9/23  Larissa Holliday MD   cholecalciferol (VITAMIN D3) 10 MCG (400 UNIT) tablet Take 1 tablet by mouth Daily. 11/16/22 9/9/23  Yvonne Nuno MD   DULoxetine (CYMBALTA) 30 MG capsule Take 1 capsule by mouth Daily. Indications: Major Depressive Disorder 2/13/23 9/9/23  Ramses Goldman MD   vitamin B-12 (CYANOCOBALAMIN) 500 MCG tablet Take 2 tablets by mouth Daily. Indications: Inadequate Vitamin B12 11/16/22 9/9/23  Yvonne Nuno MD        Social History:   Social History     Tobacco Use    Smoking status: Every Day     Packs/day: 0.50     Types: Cigarettes    Smokeless tobacco: Never   Vaping Use    Vaping Use: Never used   Substance Use Topics    Alcohol use: Never    Drug use: Never     Comment: Positive for amph/meth 2/10/23         Review of Systems:  Review of Systems   I performed a 10 point review of systems which was all negative, except for the positives found in the HPI above.        Physical Exam:  /97 (BP Location: Left arm)   Pulse 68   Temp 98.5 °F (36.9 °C)   Resp 18   Ht 162.6 cm (64\")   Wt 102 kg (224 lb 13.9 oz)   SpO2 98%   BMI 38.60 kg/m²     Physical Exam   General: Awake alert and in no obvious distress    HEENT: Head normocephalic atraumatic, eyes PERRLA EOMI, nose normal, oropharynx normal.    Neck: Supple full range of motion, no meningismus, no lymphadenopathy    Heart: Regular rate and rhythm, no murmurs or rubs, 2+ radial pulses bilaterally    Lungs: Clear to auscultation bilaterally " without wheezes or crackles, no respiratory distress    Abdomen: Soft, nontender, nondistended, no rebound or guarding    Skin: Warm, dry, no rash    Musculoskeletal: Normal range of motion, no lower extremity edema    Neurologic: Oriented x3, no motor deficits, but she does have mildly abnormal sensation to light touch along the right side of the body, and decreased vision in the right eye    Psychiatric: Mood appears stable, no psychosis          Procedures:  Procedures      Medical Decision Making:      Comorbidities that affect care:    MS, stroke history, substance abuse    External Notes reviewed:    None      The following orders were placed and all results were independently analyzed by me:  Orders Placed This Encounter   Procedures    CT Head Without Contrast Stroke Protocol    CT Angiogram Head w AI Analysis of LVO    CT Angiogram Neck    CT CEREBRAL PERFUSION WITH & WITHOUT CONTRAST    XR Chest 1 View    MRI Brain Without Contrast    [place] Draw    Comprehensive Metabolic Panel    Protime-INR    aPTT    Single High Sensitivity Troponin T    Urinalysis With Microscopic If Indicated (No Culture) - Urine, Clean Catch    CBC Auto Differential    Initiate Department's Acute Stroke Process (Team D, Code 19, etc)    Perform NIH Stroke Scale    Measure Actual Weight    Head of Bed 30 Degrees or Less    Undress and Gown    Continuous Pulse Oximetry    Vital Signs    Notify MD for SBP < 80 or > 200    Notify Provider for SBP greater than 140 if hemorrhagic Stroke    No Hypotonic Fluids    Nursing Dysphagia Screening (Complete Prior to Giving anything PO)    RN to Place Order SLP Consult (IF swallow screen failed) - Eval & Treat Choosing Reason of RN Dysphagia Screen Failed    POC Glucose Once    POC Glucose Once    ECG 12 Lead ED Triage Standing Order; Acute Stroke (Onset <12 hrs)    CBC & Differential    Green Top (Gel)    Lavender Top    Gold Top - SST    Light Blue Top       Medications Given in the Emergency  Department:  Medications   iopamidol (ISOVUE-370) 76 % injection 150 mL (150 mL Intravenous Given 9/9/23 1227)   LORazepam (ATIVAN) injection 1 mg (1 mg Intravenous Given 9/9/23 1351)   ketorolac (TORADOL) injection 30 mg (30 mg Intravenous Given 9/9/23 1351)   sodium chloride 0.9 % bolus 1,000 mL (0 mL Intravenous Stopped 9/9/23 1608)        ED Course:    ED Course as of 09/09/23 2256   Sat Sep 09, 2023   1318 EKG: I interpreted her twelve-lead EKG is normal sinus rhythm at 67 bpm, normal P waves, normal QRS and ST segments and T waves; no acute ischemia or ectopy. [VS]      ED Course User Index  [VS] Benedicto Alva MD       Labs:    Lab Results (last 24 hours)       Procedure Component Value Units Date/Time    POC Glucose Once [[id number]]  (Abnormal) Collected: 09/09/23 1151    Specimen: Blood Updated: 09/09/23 1153     Glucose 100 mg/dL      Comment: Serial Number: [id number]:  [id number]       CBC & Differential [[id number]]  (Normal) Collected: 09/09/23 1201    Specimen: Blood Updated: 09/09/23 1217    Narrative:      The following orders were created for panel order CBC & Differential.  Procedure                               Abnormality         Status                     ---------                               -----------         ------                     CBC Auto Differential[[id number]]        Normal              Final result                 Please view results for these tests on the individual orders.    Comprehensive Metabolic Panel [[id number]]  (Abnormal) Collected: 09/09/23 1201    Specimen: Blood Updated: 09/09/23 1243     Glucose 100 mg/dL      BUN 11 mg/dL      Creatinine 0.78 mg/dL      Sodium 141 mmol/L      Potassium 4.9 mmol/L      Chloride 104 mmol/L      CO2 28.0 mmol/L      Calcium 9.6 mg/dL      Total Protein 7.0 g/dL      Albumin 4.3 g/dL      ALT (SGPT) 15 U/L      AST (SGOT) 14 U/L      Alkaline Phosphatase 64 U/L      Total Bilirubin 0.3 mg/dL      Globulin 2.7 gm/dL      A/G Ratio  1.6 g/dL      BUN/Creatinine Ratio 14.1     Anion Gap 9.0 mmol/L      eGFR 99.2 mL/min/1.73     Narrative:      GFR Normal >60  Chronic Kidney Disease <60  Kidney Failure <15      Protime-INR [[id number]]  (Normal) Collected: 09/09/23 1201    Specimen: Blood Updated: 09/09/23 1227     Protime 13.1 Seconds      INR 0.98    Narrative:      Suggested Therapeutic Ranges For Oral Anticoagulant Therapy:  Level of Therapy                      INR Target Range  Standard Dose                            2.0-3.0  High Dose                                2.5-3.5  Patients not receiving anticoagulant  Therapy Normal Range                     0.86-1.15    aPTT [[id number]]  (Normal) Collected: 09/09/23 1201    Specimen: Blood Updated: 09/09/23 1227     PTT 28.1 seconds     Single High Sensitivity Troponin T [[id number]]  (Normal) Collected: 09/09/23 1201    Specimen: Blood Updated: 09/09/23 1243     HS Troponin T <6 ng/L     Narrative:      High Sensitive Troponin T Reference Range:  <10.0 ng/L- Negative Female for AMI  <15.0 ng/L- Negative Male for AMI  >=10 - Abnormal Female indicating possible myocardial injury.  >=15 - Abnormal Male indicating possible myocardial injury.   Clinicians would have to utilize clinical acumen, EKG, Troponin, and serial changes to determine if it is an Acute Myocardial Infarction or myocardial injury due to an underlying chronic condition.         CBC Auto Differential [[id number]]  (Normal) Collected: 09/09/23 1201    Specimen: Blood Updated: 09/09/23 1217     WBC 6.88 10*3/mm3      RBC 4.69 10*6/mm3      Hemoglobin 12.9 g/dL      Hematocrit 38.0 %      MCV 81.0 fL      MCH 27.5 pg      MCHC 33.9 g/dL      RDW 13.9 %      RDW-SD 40.3 fl      MPV 9.9 fL      Platelets 324 10*3/mm3      Neutrophil % 68.2 %      Lymphocyte % 20.3 %      Monocyte % 8.3 %      Eosinophil % 2.5 %      Basophil % 0.3 %      Immature Grans % 0.4 %      Neutrophils, Absolute 4.69 10*3/mm3      Lymphocytes, Absolute 1.40  10*3/mm3      Monocytes, Absolute 0.57 10*3/mm3      Eosinophils, Absolute 0.17 10*3/mm3      Basophils, Absolute 0.02 10*3/mm3      Immature Grans, Absolute 0.03 10*3/mm3      nRBC 0.0 /100 WBC     Urinalysis With Microscopic If Indicated (No Culture) - Urine, Clean Catch [[id number]]  (Abnormal) Collected: 09/09/23 1228    Specimen: Urine, Clean Catch Updated: 09/09/23 1239     Color, UA Yellow     Appearance, UA Clear     pH, UA 8.0     Specific Gravity, UA >1.030     Glucose, UA Negative     Ketones, UA Negative     Bilirubin, UA Negative     Blood, UA Negative     Protein, UA Negative     Leuk Esterase, UA Negative     Nitrite, UA Negative     Urobilinogen, UA 0.2 E.U./dL    Narrative:      Urine microscopic not indicated.             Imaging:    CT Angiogram Neck    Result Date: 9/9/2023  PROCEDURE: CT ANGIOGRAM NECK, 9/09/2023, 12:15 CT ANGIOGRAM HEAD W AI ANALYSIS OF LVO, 9/09/2023, 12:15  COMPARISON: [facility], CT, CT ANGIOGRAM HEAD W AI ANALYSIS OF LVO, 9/09/2023, 12:15.  INDICATIONS: VISION LOSS RIGHT EYE THIS AM. HEADACHE X 3 DAYS. HISTORY OF STROKE.  PROTOCOL:   Standard imaging protocol performed    RADIATION:   DLP: 1197mGy*cm   Automated exposure control was utilized to minimize radiation dose. CONTRAST: 80cc Isovue 370 I.V.  TECHNIQUE: After obtaining the patient's consent, CT images of the head and neck were obtained without and with non-ionic intravenous contrast material. Multi-planar reformatted/3-D images were created to optimize visualization of vascular anatomy. Unless otherwise stated in this report, all vascular stenoses involving the internal carotid arteries reported for this examination are derived by dividing the lesion diameter by the diameter of the normal internal carotid artery more distally.  FINDINGS:  LEFT INTERNAL CAROTID: No hemodynamically significant stenosis or dissection.  EXTERNAL CAROTID: No hemodynamically significant stenosis or dissection.  COMMON  CAROTID: No hemodynamically significant stenosis or dissection.  VERTEBRAL: No hemodynamically significant stenosis or dissection.   RIGHT INTERNAL CAROTID: No hemodynamically significant stenosis or dissection.  EXTERNAL CAROTID: No hemodynamically significant stenosis or dissection.  COMMON CAROTID: No hemodynamically significant stenosis or dissection.  VERTEBRAL: No hemodynamically significant stenosis or dissection.   CTA Head major branches of the anterior, middle and posterior cerebral arteries are widely patent without large vessel occlusion, flow-limiting stenosis, dissection, aneurysm or pseudoaneurysm.  No vascular malformation.  Symmetric distal cerebral branches.  Basilar artery and posterior cerebellar arteries are patent.  PICA branches patent.  There is an anterior communicating artery without visualized posterior communicating arteries.  Major dural venous sinuses patent.   OTHER: Cardiomegaly.2 7 x 8 millimeter nodule in the right upper lobe image 64 series 6. Low Hounsfield units, average -16.        1. Normal CT angiogram of the head and neck.  Specifically no findings of large vessel occlusion, flow-limiting stenosis or aneurysm. 2. Cardiomegaly. 3. Incidental right upper lobe pulmonary nodule measuring 7 x 8 millimeters with central low density suggesting possible intralesional fat.  This can be seen in the setting of a benign hamartoma.  Consider follow-up chest CT in 6 months to ensure stability.     MARKUS ROSEN MD       Electronically Signed and Approved By: MARKUS ROSEN MD on 9/09/2023 at 13:25             MRI Brain Without Contrast    Result Date: 9/9/2023  PROCEDURE: MRI BRAIN WO CONTRAST  COMPARISON: None  INDICATIONS: visual disturbance, dizziness, headache      TECHNIQUE: A variety of imaging planes and parameters were utilized for visualization of suspected pathology.  Images were performed without contrast.  FINDINGS:  Negative for acute infarct, intracranial hemorrhage, large  mass lesion, midline shift or hydrocephalus.  Normal parenchymal volume.  Few tiny nonspecific foci of periventricular and subcortical T2/FLAIR hyperintensities, atypical for patient age and lack of significant atherosclerotic disease.  Normal posterior pituitary T1 bright spot.  Normal corpus callosum.  No cerebellar tonsillar ectopia.  No extra-axial fluid collection.  Major intracranial flow voids preserved.  Mastoid air cells and maxillary sinuses clear.  Orbits are symmetric.        1. Negative for acute infarct, intracranial hemorrhage, mass lesion or midline shift. 2. Few small periventricular and subcortical T2/FLAIR hyperintensities, atypical for patient age and given lack of significant atherosclerotic plaque.  Differential is nonspecific and may include sequelae of prior infectious/inflammatory processes, migraines or demyelinating etiologies such as multiple sclerosis.  Consider outpatient neurology follow-up.      MARKUS ROSEN MD       Electronically Signed and Approved By: MARKUS ROSEN MD on 9/09/2023 at 15:01             XR Chest 1 View    Result Date: 9/9/2023  PROCEDURE: XR CHEST 1 VW  COMPARISON: [facility], CR, CHEST AP/PA 1 VIEW, 7/29/2018, 20:02.  INDICATIONS: Acute Stroke Protocol (Onset < 12 hrs).   CONGESTION .  FINDINGS:  Negative for lobar infiltrate, edema, effusion or pneumothorax.  Heart size within normal limits.  Osseous structures unremarkable.        No active pulmonary process.       MARKUS ROSEN MD       Electronically Signed and Approved By: MARKUS ROSEN MD on 9/09/2023 at 12:50             CT Head Without Contrast Stroke Protocol    Result Date: 9/9/2023  PROCEDURE: CT HEAD WO CONTRAST STROKE PROTOCOL  COMPARISON:  [facility], CT, HEAD W/O CSPINE W/O CONTRAST, 7/29/2018, 20:08. INDICATIONS: LOSS OF VISION OF RIGHT EYE THIS AM. HEADACHE X 3 DAYS. HISTORY OF STROKE.  PROTOCOL:   Standard imaging protocol performed    RADIATION:   DLP:  919mGy*cm   MA and/or KV was adjusted to minimize radiation dose.     TECHNIQUE: After obtaining the patient's consent, CT images were obtained without non-ionic intravenous contrast material.  FINDINGS:  CEREBRUM: No edema, hemorrhage, mass, acute infarction, or inappropriate atrophy.  CEREBELLUM: No edema, hemorrhage, mass, acute infarction, or inappropriate atrophy.  BRAINSTEM: No edema, hemorrhage, mass, acute infarction, or inappropriate atrophy.  CSF SPACES: Ventricles, cisterns, and sulci are appropriate for age.  No hydrocephalus, subarachnoid hemorrhage, or mass.  SKULL: No mass or other significant visible lesion.  SINUSES: Limited views demonstrate no significant mucosal thickening or fluid.  ORBITS: Limited views are unremarkable.  OTHER: Negative.         No acute intracranial abnormality by CT.  Specifically negative for acute intracranial hemorrhage or large territory infarct.     MARKUS ROSEN MD       Electronically Signed and Approved By: MARKUS ROSEN MD on 9/09/2023 at 12:20             CT Angiogram Head w AI Analysis of LVO    Result Date: 9/9/2023  PROCEDURE: CT ANGIOGRAM NECK, 9/09/2023, 12:15 CT ANGIOGRAM HEAD W AI ANALYSIS OF LVO, 9/09/2023, 12:15  COMPARISON: [facility], CT, CT ANGIOGRAM HEAD W AI ANALYSIS OF LVO, 9/09/2023, 12:15.  INDICATIONS: VISION LOSS RIGHT EYE THIS AM. HEADACHE X 3 DAYS. HISTORY OF STROKE.  PROTOCOL:   Standard imaging protocol performed    RADIATION:   DLP: 1197mGy*cm   Automated exposure control was utilized to minimize radiation dose. CONTRAST: 80cc Isovue 370 I.V.  TECHNIQUE: After obtaining the patient's consent, CT images of the head and neck were obtained without and with non-ionic intravenous contrast material. Multi-planar reformatted/3-D images were created to optimize visualization of vascular anatomy. Unless otherwise stated in this report, all vascular stenoses involving the internal carotid arteries reported for this examination are  derived by dividing the lesion diameter by the diameter of the normal internal carotid artery more distally.  FINDINGS:  LEFT INTERNAL CAROTID: No hemodynamically significant stenosis or dissection.  EXTERNAL CAROTID: No hemodynamically significant stenosis or dissection.  COMMON CAROTID: No hemodynamically significant stenosis or dissection.  VERTEBRAL: No hemodynamically significant stenosis or dissection.   RIGHT INTERNAL CAROTID: No hemodynamically significant stenosis or dissection.  EXTERNAL CAROTID: No hemodynamically significant stenosis or dissection.  COMMON CAROTID: No hemodynamically significant stenosis or dissection.  VERTEBRAL: No hemodynamically significant stenosis or dissection.   CTA Head major branches of the anterior, middle and posterior cerebral arteries are widely patent without large vessel occlusion, flow-limiting stenosis, dissection, aneurysm or pseudoaneurysm.  No vascular malformation.  Symmetric distal cerebral branches.  Basilar artery and posterior cerebellar arteries are patent.  PICA branches patent.  There is an anterior communicating artery without visualized posterior communicating arteries.  Major dural venous sinuses patent.   OTHER: Cardiomegaly.2 7 x 8 millimeter nodule in the right upper lobe image 64 series 6. Low Hounsfield units, average -16.        1. Normal CT angiogram of the head and neck.  Specifically no findings of large vessel occlusion, flow-limiting stenosis or aneurysm. 2. Cardiomegaly. 3. Incidental right upper lobe pulmonary nodule measuring 7 x 8 millimeters with central low density suggesting possible intralesional fat.  This can be seen in the setting of a benign hamartoma.  Consider follow-up chest CT in 6 months to ensure stability.     MARKUS ROSEN MD       Electronically Signed and Approved By: MARKUS ROSEN MD on 9/09/2023 at 13:25             CT CEREBRAL PERFUSION WITH & WITHOUT CONTRAST    Result Date: 9/9/2023  PROCEDURE: CT CEREBRAL  PERFUSION W WO CONTRAST  COMPARISON: None  INDICATIONS: LOSS OF VISION RIGHT EYE THIS AM. HEADACHE X 3 DAYS. HISTORY OF STROKE  PROTOCOL:   Standard imaging protocol performed    RADIATION:   DLP: 3876mGy*cm   Automated exposure control was utilized to minimize radiation dose. CONTRAST: 80cc Isovue 370 I.V.   FINDINGS:  Adequate exam quality.  Normal perfusion exam without core infarct or ischemic penumbra.  Symmetric cerebral blood volume, cerebral blood flow, mean transit time and T-max color maps.        Normal CT perfusion.      MARKUS ROSEN MD       Electronically Signed and Approved By: MARKUS ROSEN MD on 9/09/2023 at 12:44                Differential Diagnosis and Discussion:    Stroke: Differential diagnosis in this patient with signs of possible ischemic stroke include TIA or ischemic stroke, hemorrhagic stroke, hypoglycemic episode, toxic or metabolic encephalopathy, paresthesias.    All labs were reviewed and interpreted by me.  All X-rays impressions were independently interpreted by me.  EKG was interpreted by me.  CT scan radiology impression was interpreted by me.  MRI impression was interpreted by me.     MDM     Amount and/or Complexity of Data Reviewed  Clinical lab tests: reviewed  Tests in the radiology section of CPT®: reviewed  Tests in the medicine section of CPT®: reviewed                   This patient is a 38-year-old female with history of MS and prior stroke now presenting with acute right eye vision loss and also right-sided numbness or abnormal sensation starting this morning.      However last time known to be well was last night before bed at around 9 PM, so felt to be outside of the window for TNKase, and in addition my initial NIH stroke scale score was only a 1 due to abnormal sensation on the right side..    She was made a stroke alert on arrival and seen by our on-call teleneurologist who gave plan of management recommendations.    CT of the head was negative for bleed and  CTA of head and neck are pending but I am doubtful for large vessel occlusion based off exam.    I am getting an MRI of the brain to rule out acute ischemic stroke versus signs of MS flare like optic neuritis.    Meanwhile I am treating her for possible migraine headache with IV Toradol and fluids.    I had to give her a one-time dose of Ativan to perform the MRI given her history of claustrophobia from this.      Her MRI actually came back showing no acute stroke but possibility of signs of multiple sclerosis and I talked to the patient about consideration of starting her on some steroids, but she declined them as she has had adverse effects before and prefers to just follow-up on her own with her neurologist and ophthalmologist this week.              Critical Care Note: Total Critical Care time of 30 minutes. Total critical care time documented does not include time spent on separately billed procedures for services of nurses or physician assistants. I personally saw and examined the patient. I have reviewed all diagnostic interpretations and treatment plans as written. I was present for the key portions of any procedures performed and the inclusive time noted in any critical care statement. Critical care time includes patient management by me, time spent at the patients bedside,  time to review lab and imaging results, discussing patient care, documentation in the medical record, and time spent with family or caregiver.    Patient Care Considerations:          Consultants/Shared Management Plan:    Consultant: I have discussed the case with our on-call teleneurologist, who agrees to consult on the patient.    Social Determinants of Health:    Patient presents from local rehab facility      Disposition and Care Coordination:    Discharged: I considered escalation of care by admitting this patient to the hospital, however the patient has improved and is suitable and stable for discharge.    I have explained the  patient´s condition, diagnoses and treatment plan based on the information available to me at this time. I have answered questions and addressed any concerns. The patient has a good  understanding of the patient´s diagnosis, condition, and treatment plan as can be expected at this point. The vital signs have been stable. The patient´s condition is stable and appropriate for discharge from the emergency department.      The patient will pursue further outpatient evaluation with the primary care physician or other designated or consulting physician as outlined in the discharge instructions. They are agreeable to this plan of care and follow-up instructions have been explained in detail. The patient has received these instructions in written format and have expressed an understanding of the discharge instructions. The patient is aware that any significant change in condition or worsening of symptoms should prompt an immediate return to this or the closest emergency department or call to [facility].  I have explained discharge medications and the need for follow up with the patient/caretakers. This was also printed in the discharge instructions. Patient was discharged with the following medications and follow up:      Medication List        New Prescriptions      ketorolac 10 MG tablet  Commonly known as: TORADOL  Take 1 tablet by mouth Every 6 (Six) Hours As Needed for Moderate Pain or Severe Pain.               Where to Get Your Medications        These medications were sent to [facility] - [place], [place] - [address] - [phone]  - [phone] [address] [address]      Phone: [phone]   ketorolac 10 MG tablet      Grecia Do APRN  [address] [facility] [address] [phone]    Call in 2 days  for a follow-up appointment       Final diagnoses:   Vision loss, right eye   Acute non intractable tension-type headache   Multiple sclerosis exacerbation         ED Disposition       ED Disposition   Discharge    Condition   Stable    Comment   --               This medical record created using voice recognition software.             Benedicto Alva MD  09/09/23 8099

## 2023-09-09 NOTE — DISCHARGE INSTRUCTIONS
Your CT scans and MRI of the brain looked okay and no signs of stroke or tumors or anything life-threatening was found but it looks like this could be a flareup of your MS.    Typically we start high-dose steroids for these type of presentations, but given your previous adverse side effects to this you may need to talk to your neurologist specifically on best treatment.    Please follow-up with your neurologist regarding your vision changes and also with your ophthalmologist for dilated eye exam as needed.

## 2023-09-09 NOTE — CONSULTS
[facility]  [facility]      Patient Name:   Corinne Small  YOB: 1985  Identification Number:   MRN - [id number]  Date of Service:   09/09/2023 11:52:45    Diagnosis:        H53.8 - Blurred Vision        R51.9 - Headache, unspecified    Impression:       Patient with headache for a few days and right eye blurred vision today. She reports previous symptoms but persistence today is new. On exam she has right sided numbness. Unclear history of prior stroke as she reports symptoms of monocular vision blurring as the symptoms. Recommend admission for further work up for stroke. Ophthalmology consult recommended for right eye blurred vision.    Our recommendations are outlined below.    Recommendations:          Stroke/Telemetry Floor        Neuro Checks        Bedside Swallow Eval        DVT Prophylaxis        IV Fluids, Normal Saline        Head of Bed 30 Degrees        Euglycemia and Avoid Hyperthermia (PRN Acetaminophen)        Initiate or continue Aspirin 81 MG daily    Recommended Scan:       MRI Head Without Contrast    Lipid Panel to Be Obtained, if Not Done in the Last 30 Days    Therapies:        Physical Therapy, Occupational Therapy, Speech Therapy Assessment When Applicable    Dysphagia:        Swallow Evaluation, Bedside        NPO Until Swallow Evaluation    DVT prophylaxis:        Choice of Primary Team    Disposition:        Neurology Follow Up Recommended    Sign Out:        Discussed with Emergency Department Provider        ------------------------------------------------------------------------------    Advanced Imaging:  CTA Head and Neck Completed.    CTP Completed.    LVO:No    Patient in not a candidate for KELVIN      Metrics:  Last Known Well: 09/08/2023 21:00:00  [facility] Notification Time: 09/09/2023 11:51:54  Arrival Time: 09/09/2023 11:41:00  Stamp Time: 09/09/2023 11:52:45  Initial Response Time: 09/09/2023 11:56:48  Symptoms:  right eye vision loss.  Initial patient interaction: 09/09/2023 11:59:21  NIHSS Assessment Completed: 09/09/2023 12:05:36  Patient is not a candidate for Thrombolytic.  Thrombolytic Medical Decision: 09/09/2023 12:05:37  Patient was not deemed candidate for Thrombolytic because of following reasons:  Last Well Known Above 4.5 Hours.    CT head showed no acute hemorrhage or acute core infarct.    Primary Provider Notified of Diagnostic Impression and Management Plan on: 09/09/2023 12:41:27        ------------------------------------------------------------------------------    History of Present Illness:  Patient is a 38 year old Female.    Patient was brought by private transportation with symptoms of right eye vision loss.  37 yo F with history of stroke, htn and amphetamine use who is presenting with headache and right eye blurred vision.    She has had a headache to 2-3 days. She will loose vision in her right eye for a few seconds. She woke up with no vision in her right eye when she woke up at 9:30. She feels slow and numb around her right eye. Her body feels heavier on the right.    She went to bed normal at 21:00 other than a headache. She thought it was from reading more than usual.      Past Medical History:       Hypertension       Stroke       There is no history of Diabetes Mellitus       There is no history of Hyperlipidemia       There is no history of Atrial Fibrillation       There is no history of Coronary Artery Disease    Medications:    No Anticoagulant use   Antiplatelet use: Yes aspirin 81mg  Reviewed EMR for current medications    Allergies:   Reviewed    Social History:  Smoking: Yes  Alcohol Use: Former  Drug Use: Former    Family History:    There is no family history of premature cerebrovascular disease pertinent to this consultation    ROS :  14 Points Review of Systems was performed and was negative except mentioned in HPI.    Past Surgical History:  There Is No Surgical History  Contributory To Today’s Visit        Examination:  BP(157/97), Pulse(73),  1A: Level of Consciousness - Alert; keenly responsive + 0  1B: Ask Month and Age - Both Questions Right + 0  1C: Blink Eyes & Squeeze Hands - Performs Both Tasks + 0  2: Test Horizontal Extraocular Movements - Normal + 0  3: Test Visual Fields - No Visual Loss + 0  4: Test Facial Palsy (Use Grimace if Obtunded) - Normal symmetry + 0  5A: Test Left Arm Motor Drift - No Drift for 10 Seconds + 0  5B: Test Right Arm Motor Drift - No Drift for 10 Seconds + 0  6A: Test Left Leg Motor Drift - No Drift for 5 Seconds + 0  6B: Test Right Leg Motor Drift - No Drift for 5 Seconds + 0  7: Test Limb Ataxia (FNF/Heel-Shin) - No Ataxia + 0  8: Test Sensation - Mild-Moderate Loss: Can Sense Being Touched + 1  9: Test Language/Aphasia - Normal; No aphasia + 0  10: Test Dysarthria - Normal + 0  11: Test Extinction/Inattention - No abnormality + 0    NIHSS Score: 1    Pre-Morbid Modified [place] Scale:  1 Points = No significant disability despite symptoms; able to carry out all usual duties and activities    Spoke with : ED MD  I reviewed the available imaging via Rapid and initiated discussion with the primary provider    Patient/Family was informed the Neurology Consult would occur via TeleHealth consult by way of interactive audio and video telecommunications and consented to receiving care in this manner.      Patient is being evaluated for possible acute neurologic impairment and high probability of imminent or life-threatening deterioration. I spent total of 35 minutes providing care to this patient, including time for face to face visit via telemedicine, review of medical records, imaging studies and discussion of findings with providers, the patient and/or family.      Dr Tierney Valles      [facility]  For Inpatient follow-up with [facility] physician please call [facility] [phone]. This is not an outpatient service. Post hospital discharge,  please contact hospital directly.    Addendum:  No acute process on MRI brain. Patient has a history of MS which would fit better with her fluctuating right eye blurry vision. Patient states she can not take steroids. Recommend follow up with her neurologist and ophthalmology.    Tierney Valles, DO  [facility]

## 2023-09-10 LAB
QT INTERVAL: 381 MS
QTC INTERVAL: 403 MS

## 2023-09-21 ENCOUNTER — HOSPITAL ENCOUNTER (EMERGENCY)
Facility: HOSPITAL | Age: 38
Discharge: HOME | End: 2023-09-21
Attending: EMERGENCY MEDICINE
Payer: COMMERCIAL

## 2023-09-21 VITALS
WEIGHT: 225.97 LBS | RESPIRATION RATE: 16 BRPM | TEMPERATURE: 98.8 F | OXYGEN SATURATION: 99 % | HEART RATE: 83 BPM | BODY MASS INDEX: 38.58 KG/M2 | SYSTOLIC BLOOD PRESSURE: 137 MMHG | HEIGHT: 64 IN | DIASTOLIC BLOOD PRESSURE: 115 MMHG

## 2023-09-21 DIAGNOSIS — R51.9 ACUTE NONINTRACTABLE HEADACHE, UNSPECIFIED HEADACHE TYPE: ICD-10-CM

## 2023-09-21 DIAGNOSIS — I10 ESSENTIAL HYPERTENSION: Primary | ICD-10-CM

## 2023-09-21 DIAGNOSIS — R11.0 NAUSEA: ICD-10-CM

## 2023-09-21 LAB
ALBUMIN SERPL-MCNC: 4.4 G/DL (ref 3.5–5.2)
ALBUMIN/GLOB SERPL: 1.6 G/DL
ALP SERPL-CCNC: 74 U/L (ref 39–117)
ALT SERPL W P-5'-P-CCNC: 21 U/L (ref 1–33)
ANION GAP SERPL CALCULATED.3IONS-SCNC: 9.4 MMOL/L (ref 5–15)
AST SERPL-CCNC: 16 U/L (ref 1–32)
BASOPHILS # BLD AUTO: 0.04 10*3/MM3 (ref 0–0.2)
BASOPHILS NFR BLD AUTO: 0.5 % (ref 0–1.5)
BILIRUB SERPL-MCNC: 0.2 MG/DL (ref 0–1.2)
BILIRUB UR QL STRIP: NEGATIVE
BUN SERPL-MCNC: 16 MG/DL (ref 6–20)
BUN/CREAT SERPL: 20 (ref 7–25)
CALCIUM SPEC-SCNC: 9.5 MG/DL (ref 8.6–10.5)
CHLORIDE SERPL-SCNC: 104 MMOL/L (ref 98–107)
CLARITY UR: ABNORMAL
CO2 SERPL-SCNC: 25.6 MMOL/L (ref 22–29)
COLOR UR: YELLOW
CREAT SERPL-MCNC: 0.8 MG/DL (ref 0.57–1)
DEPRECATED RDW RBC AUTO: 41.3 FL (ref 37–54)
EGFRCR SERPLBLD CKD-EPI 2021: 96.3 ML/MIN/1.73
EOSINOPHIL # BLD AUTO: 0.21 10*3/MM3 (ref 0–0.4)
EOSINOPHIL NFR BLD AUTO: 2.5 % (ref 0.3–6.2)
ERYTHROCYTE [DISTWIDTH] IN BLOOD BY AUTOMATED COUNT: 13.6 % (ref 12.3–15.4)
GLOBULIN UR ELPH-MCNC: 2.7 GM/DL
GLUCOSE SERPL-MCNC: 112 MG/DL (ref 65–99)
GLUCOSE UR STRIP-MCNC: NEGATIVE MG/DL
HCT VFR BLD AUTO: 39.6 % (ref 34–46.6)
HGB BLD-MCNC: 12.6 G/DL (ref 12–15.9)
HGB UR QL STRIP.AUTO: NEGATIVE
IMM GRANULOCYTES # BLD AUTO: 0.04 10*3/MM3 (ref 0–0.05)
IMM GRANULOCYTES NFR BLD AUTO: 0.5 % (ref 0–0.5)
KETONES UR QL STRIP: ABNORMAL
LEUKOCYTE ESTERASE UR QL STRIP.AUTO: NEGATIVE
LYMPHOCYTES # BLD AUTO: 1.77 10*3/MM3 (ref 0.7–3.1)
LYMPHOCYTES NFR BLD AUTO: 21.2 % (ref 19.6–45.3)
MCH RBC QN AUTO: 26.7 PG (ref 26.6–33)
MCHC RBC AUTO-ENTMCNC: 31.8 G/DL (ref 31.5–35.7)
MCV RBC AUTO: 83.9 FL (ref 79–97)
MONOCYTES # BLD AUTO: 0.56 10*3/MM3 (ref 0.1–0.9)
MONOCYTES NFR BLD AUTO: 6.7 % (ref 5–12)
NEUTROPHILS NFR BLD AUTO: 5.72 10*3/MM3 (ref 1.7–7)
NEUTROPHILS NFR BLD AUTO: 68.6 % (ref 42.7–76)
NITRITE UR QL STRIP: NEGATIVE
NRBC BLD AUTO-RTO: 0 /100 WBC (ref 0–0.2)
PH UR STRIP.AUTO: 5.5 [PH] (ref 5–8)
PLATELET # BLD AUTO: 388 10*3/MM3 (ref 140–450)
PMV BLD AUTO: 10.2 FL (ref 6–12)
POTASSIUM SERPL-SCNC: 4.6 MMOL/L (ref 3.5–5.2)
PROT SERPL-MCNC: 7.1 G/DL (ref 6–8.5)
PROT UR QL STRIP: ABNORMAL
RBC # BLD AUTO: 4.72 10*6/MM3 (ref 3.77–5.28)
SODIUM SERPL-SCNC: 139 MMOL/L (ref 136–145)
SP GR UR STRIP: >1.03 (ref 1–1.03)
UROBILINOGEN UR QL STRIP: ABNORMAL
WBC NRBC COR # BLD: 8.34 10*3/MM3 (ref 3.4–10.8)

## 2023-09-21 PROCEDURE — 63710000001 ONDANSETRON ODT 4 MG TABLET DISPERSIBLE: Performed by: EMERGENCY MEDICINE

## 2023-09-21 PROCEDURE — 36415 COLL VENOUS BLD VENIPUNCTURE: CPT

## 2023-09-21 PROCEDURE — 63710000001 PROMETHAZINE PER 25 MG: Performed by: EMERGENCY MEDICINE

## 2023-09-21 PROCEDURE — 93010 ELECTROCARDIOGRAM REPORT: CPT | Performed by: INTERNAL MEDICINE

## 2023-09-21 PROCEDURE — 85025 COMPLETE CBC W/AUTO DIFF WBC: CPT | Performed by: EMERGENCY MEDICINE

## 2023-09-21 PROCEDURE — 25010000002 KETOROLAC TROMETHAMINE PER 15 MG: Performed by: EMERGENCY MEDICINE

## 2023-09-21 PROCEDURE — 81003 URINALYSIS AUTO W/O SCOPE: CPT | Performed by: EMERGENCY MEDICINE

## 2023-09-21 PROCEDURE — 36415 COLL VENOUS BLD VENIPUNCTURE: CPT | Performed by: EMERGENCY MEDICINE

## 2023-09-21 PROCEDURE — 96372 THER/PROPH/DIAG INJ SC/IM: CPT

## 2023-09-21 PROCEDURE — 99283 EMERGENCY DEPT VISIT LOW MDM: CPT

## 2023-09-21 PROCEDURE — 80053 COMPREHEN METABOLIC PANEL: CPT | Performed by: EMERGENCY MEDICINE

## 2023-09-21 PROCEDURE — 93005 ELECTROCARDIOGRAM TRACING: CPT | Performed by: EMERGENCY MEDICINE

## 2023-09-21 RX ORDER — KETOROLAC TROMETHAMINE 30 MG/ML
30 INJECTION, SOLUTION INTRAMUSCULAR; INTRAVENOUS ONCE
Status: COMPLETED | OUTPATIENT
Start: 2023-09-21 | End: 2023-09-21

## 2023-09-21 RX ORDER — CLONIDINE HYDROCHLORIDE 0.2 MG/1
0.2 TABLET ORAL 2 TIMES DAILY
Qty: 60 TABLET | Refills: 0 | Status: SHIPPED | OUTPATIENT
Start: 2023-09-21

## 2023-09-21 RX ORDER — PROMETHAZINE HYDROCHLORIDE 25 MG/1
25 TABLET ORAL ONCE
Status: COMPLETED | OUTPATIENT
Start: 2023-09-21 | End: 2023-09-21

## 2023-09-21 RX ORDER — ONDANSETRON 4 MG/1
4 TABLET, ORALLY DISINTEGRATING ORAL EVERY 6 HOURS PRN
Qty: 15 TABLET | Refills: 0 | Status: SHIPPED | OUTPATIENT
Start: 2023-09-21

## 2023-09-21 RX ORDER — ONDANSETRON 4 MG/1
4 TABLET, ORALLY DISINTEGRATING ORAL ONCE
Status: COMPLETED | OUTPATIENT
Start: 2023-09-21 | End: 2023-09-21

## 2023-09-21 RX ORDER — CLONIDINE HYDROCHLORIDE 0.1 MG/1
0.2 TABLET ORAL ONCE
Status: COMPLETED | OUTPATIENT
Start: 2023-09-21 | End: 2023-09-21

## 2023-09-21 RX ADMIN — PROMETHAZINE HYDROCHLORIDE 25 MG: 25 TABLET ORAL at 18:47

## 2023-09-21 RX ADMIN — ONDANSETRON 4 MG: 4 TABLET, ORALLY DISINTEGRATING ORAL at 16:17

## 2023-09-21 RX ADMIN — CLONIDINE HYDROCHLORIDE 0.2 MG: 0.1 TABLET ORAL at 18:46

## 2023-09-21 RX ADMIN — KETOROLAC TROMETHAMINE 30 MG: 30 INJECTION, SOLUTION INTRAMUSCULAR; INTRAVENOUS at 16:17

## 2023-09-21 NOTE — ED PROVIDER NOTES
Time: 2:27 PM EDT  Date of encounter:  2023  Independent Historian/Clinical History and Information was obtained by:   Patient    History is limited by: N/A    Chief Complaint   Patient presents with    Hypertension    Headache         History of Present Illness:  Patient is a 38 y.o. year old female who presents to the emergency department for evaluation of headache and hypertension. BP was something over 132 prior to arrival..  Admits nausea.  Denies vomiting or vision changes.  Taking clonidine.    HPI    Patient Care Team  Primary Care Provider: Grecia Do APRN    Past Medical History:     Allergies   Allergen Reactions    Clindamycin/Lincomycin GI Intolerance    Solu-Medrol [Methylprednisolone Sodium Succ] Hives    Strawberry Extract Unknown - High Severity     Pt uncooperative with question, but noted in previous chart 2013    Lisinopril Swelling     Past Medical History:   Diagnosis Date    Anxiety     MS (multiple sclerosis)     RA (rheumatoid arthritis)     Stroke      Past Surgical History:   Procedure Laterality Date     SECTION       Family History   Problem Relation Age of Onset    Heart disease Mother     Heart disease Other        Home Medications:  Prior to Admission medications    Medication Sig Start Date End Date Taking? Authorizing Provider   aspirin 81 MG chewable tablet Chew 1 tablet Daily.    Yvonne Nuno MD   buPROPion XL (WELLBUTRIN XL) 150 MG 24 hr tablet Take 1 tablet by mouth Daily.    Yvonne Nuno MD   cloNIDine (CATAPRES) 0.1 MG tablet Take 1 tablet by mouth 2 (Two) Times a Day.    Yvonne Nuno MD   FLUoxetine (PROzac) 20 MG capsule Take 1 capsule by mouth Daily.    Yvonne Nuno MD   ibuprofen (ADVIL,MOTRIN) 200 MG tablet Take 1 tablet by mouth Every 6 (Six) Hours As Needed for Mild Pain.    Yvonne Nuno MD   ketorolac (TORADOL) 10 MG tablet Take 1 tablet by mouth Every 6 (Six) Hours As Needed for Moderate Pain or  "Severe Pain. 9/9/23   Benedicto Alva MD   risperiDONE (risperDAL) 1 MG tablet Take 1 tablet by mouth 2 (Two) Times a Day.    Provider, MD Yvonne        Social History:   Social History     Tobacco Use    Smoking status: Every Day     Packs/day: 0.50     Types: Cigarettes    Smokeless tobacco: Never   Vaping Use    Vaping Use: Never used   Substance Use Topics    Alcohol use: Never    Drug use: Never     Comment: Positive for amph/meth 2/10/23         Review of Systems:  Review of Systems   Gastrointestinal:  Positive for nausea.   Neurological:  Positive for headaches.      Physical Exam:  BP (!) 137/115   Pulse 83   Temp 98.8 °F (37.1 °C) (Oral)   Resp 16   Ht 162.6 cm (64\")   Wt 102 kg (225 lb 15.5 oz)   SpO2 99%   BMI 38.79 kg/m²         Physical Exam  Vitals and nursing note reviewed.   Constitutional:       Appearance: Normal appearance.   HENT:      Head: Normocephalic and atraumatic.      Mouth/Throat:      Mouth: Mucous membranes are moist.   Eyes:      Pupils: Pupils are equal, round, and reactive to light.   Cardiovascular:      Rate and Rhythm: Normal rate and regular rhythm.      Heart sounds: Normal heart sounds.   Pulmonary:      Effort: Pulmonary effort is normal.      Breath sounds: Normal breath sounds.   Abdominal:      General: There is no distension.   Musculoskeletal:         General: Normal range of motion.      Cervical back: Normal range of motion.   Skin:     General: Skin is warm and dry.   Neurological:      General: No focal deficit present.      Mental Status: She is alert and oriented to person, place, and time.   Psychiatric:         Mood and Affect: Mood normal.                    Procedures:  Procedures      Medical Decision Making:      Comorbidities that affect care:  Hypertension, CVA, multiple sclerosis, anxiety    External Notes reviewed:    Previous ED Note: Seen in this ER on 9/9/2023 for headache and visual disturbance.      The following orders were placed and " all results were independently analyzed by me:  Orders Placed This Encounter   Procedures    Comprehensive Metabolic Panel    Urinalysis With Microscopic If Indicated (No Culture) - Urine, Clean Catch    CBC Auto Differential    ECG 12 Lead Other; htn    CBC & Differential       Medications Given in the Emergency Department:  Medications   ketorolac (TORADOL) injection 30 mg (30 mg Intramuscular Given 9/21/23 1617)   ondansetron ODT (ZOFRAN-ODT) disintegrating tablet 4 mg (4 mg Oral Given 9/21/23 1617)   cloNIDine (CATAPRES) tablet 0.2 mg (0.2 mg Oral Given 9/21/23 1846)   promethazine (PHENERGAN) tablet 25 mg (25 mg Oral Given 9/21/23 1847)        ED Course:    The patient was initially evaluated in the triage area where orders were placed. The patient was later dispositioned by Maykel Arambula DO.      The patient was advised to stay for completion of workup which includes but is not limited to communication of labs and radiological results, reassessment and plan. The patient was advised that leaving prior to disposition by a provider could result in critical findings that are not communicated to the patient.     ED Course as of 09/21/23 1852   Thu Sep 21, 2023   1430 --- PROVIDER IN TRIAGE NOTE ---    The patient was evaluated by Jose forman in triage. Orders were placed and the patient is currently awaiting disposition.    [AJ]      ED Course User Index  [AJ] Jose Strauss PA-C       Labs:    Lab Results (last 24 hours)       Procedure Component Value Units Date/Time    Comprehensive Metabolic Panel [[id number]]  (Abnormal) Collected: 09/21/23 1437    Specimen: Blood from Arm, Right Updated: 09/21/23 1530     Glucose 112 mg/dL      BUN 16 mg/dL      Creatinine 0.80 mg/dL      Sodium 139 mmol/L      Potassium 4.6 mmol/L      Chloride 104 mmol/L      CO2 25.6 mmol/L      Calcium 9.5 mg/dL      Total Protein 7.1 g/dL      Albumin 4.4 g/dL      ALT (SGPT) 21 U/L      AST (SGOT) 16 U/L      Alkaline  Phosphatase 74 U/L      Total Bilirubin 0.2 mg/dL      Globulin 2.7 gm/dL      A/G Ratio 1.6 g/dL      BUN/Creatinine Ratio 20.0     Anion Gap 9.4 mmol/L      eGFR 96.3 mL/min/1.73     Narrative:      GFR Normal >60  Chronic Kidney Disease <60  Kidney Failure <15      CBC & Differential [[id number]]  (Normal) Collected: 09/21/23 1437    Specimen: Blood from Arm, Right Updated: 09/21/23 1503    Narrative:      The following orders were created for panel order CBC & Differential.  Procedure                               Abnormality         Status                     ---------                               -----------         ------                     CBC Auto Differential[[id number]]        Normal              Final result                 Please view results for these tests on the individual orders.    CBC Auto Differential [[id number]]  (Normal) Collected: 09/21/23 1437    Specimen: Blood from Arm, Right Updated: 09/21/23 1503     WBC 8.34 10*3/mm3      RBC 4.72 10*6/mm3      Hemoglobin 12.6 g/dL      Hematocrit 39.6 %      MCV 83.9 fL      MCH 26.7 pg      MCHC 31.8 g/dL      RDW 13.6 %      RDW-SD 41.3 fl      MPV 10.2 fL      Platelets 388 10*3/mm3      Neutrophil % 68.6 %      Lymphocyte % 21.2 %      Monocyte % 6.7 %      Eosinophil % 2.5 %      Basophil % 0.5 %      Immature Grans % 0.5 %      Neutrophils, Absolute 5.72 10*3/mm3      Lymphocytes, Absolute 1.77 10*3/mm3      Monocytes, Absolute 0.56 10*3/mm3      Eosinophils, Absolute 0.21 10*3/mm3      Basophils, Absolute 0.04 10*3/mm3      Immature Grans, Absolute 0.04 10*3/mm3      nRBC 0.0 /100 WBC     Urinalysis With Microscopic If Indicated (No Culture) - Urine, Clean Catch [[id number]]  (Abnormal) Collected: 09/21/23 1531    Specimen: Urine, Clean Catch Updated: 09/21/23 1541     Color, UA Yellow     Appearance, UA Cloudy     pH, UA 5.5     Specific Gravity, UA >1.030     Glucose, UA Negative     Ketones, UA Trace     Bilirubin, UA Negative     Blood, UA  Negative     Protein, UA Trace     Leuk Esterase, UA Negative     Nitrite, UA Negative     Urobilinogen, UA 0.2 E.U./dL    Narrative:      Urine microscopic not indicated.             Imaging:    No Radiology Exams Resulted Within Past 24 Hours      Differential Diagnosis and Discussion:      Headache: Differential diagnosis includes but is not limited to migraine, cluster headache, hypertension, tumor, subarachnoid bleeding, pseudotumor cerebri, temporal arteritis, infections, tension headache, and TMJ syndrome.    All labs were reviewed and interpreted by me.    MDM  Patient's symptoms are improved with Toradol and nausea medication.  Patient has been out of her clonidine that she takes for blood pressure control.  Patient was given clonidine in the emergency department and provided a prescription for clonidine and Zofran due to her having ongoing nausea recently.  Patient is currently at a rehab program and will follow-up with her PCP upon her release.        Patient Care Considerations:    CT HEAD: I considered ordering a noncontrast CT of the head, however patient's headache had resolved.      Consultants/Shared Management Plan:    None    Social Determinants of Health:    Patient is independent, reliable, and has access to care.       Disposition and Care Coordination:    Discharged: The patient is suitable and stable for discharge with no need for consideration of observation or admission.    I have explained discharge medications and the need for follow up with the patient/caretakers. This was also printed in the discharge instructions. Patient was discharged with the following medications and follow up:      Medication List        New Prescriptions      ondansetron ODT 4 MG disintegrating tablet  Commonly known as: ZOFRAN-ODT  Place 1 tablet on the tongue Every 6 (Six) Hours As Needed for Nausea or Vomiting.            Changed      * cloNIDine 0.1 MG tablet  Commonly known as: CATAPRES  What changed:  Another medication with the same name was added. Make sure you understand how and when to take each.     * cloNIDine 0.2 MG tablet  Commonly known as: CATAPRES  Take 1 tablet by mouth 2 (Two) Times a Day.  What changed: You were already taking a medication with the same name, and this prescription was added. Make sure you understand how and when to take each.           * This list has 2 medication(s) that are the same as other medications prescribed for you. Read the directions carefully, and ask your doctor or other care provider to review them with you.                   Where to Get Your Medications        These medications [place] [facility] [place] [address] [facility] [facility] [address] - [phone] [facility] [phone]   [address], [facility] [address]      Phone: [phone]   cloNIDine 0.2 MG tablet  ondansetron ODT 4 MG disintegrating tablet      Grecia Do APRN  [address] [id number] [phone]    Schedule an appointment as soon as possible for a visit          Final diagnoses:   Essential hypertension   Acute nonintractable headache, unspecified headache type   Nausea        ED Disposition       ED Disposition   Discharge    Condition   Stable    Comment   --               This medical record created using voice recognition software.             Maykel Arambula DO  09/21/23 0025

## 2023-09-22 LAB
QT INTERVAL: 411 MS
QTC INTERVAL: 456 MS

## 2023-12-23 ENCOUNTER — HOSPITAL ENCOUNTER (EMERGENCY)
Facility: HOSPITAL | Age: 38
Discharge: HOME | End: 2023-12-23
Attending: EMERGENCY MEDICINE
Payer: COMMERCIAL

## 2023-12-23 ENCOUNTER — APPOINTMENT (OUTPATIENT)
Dept: GENERAL RADIOLOGY | Facility: HOSPITAL | Age: 38
End: 2023-12-23
Payer: COMMERCIAL

## 2023-12-23 ENCOUNTER — APPOINTMENT (OUTPATIENT)
Dept: CT IMAGING | Facility: HOSPITAL | Age: 38
End: 2023-12-23
Payer: COMMERCIAL

## 2023-12-23 VITALS
RESPIRATION RATE: 16 BRPM | TEMPERATURE: 99.8 F | DIASTOLIC BLOOD PRESSURE: 93 MMHG | OXYGEN SATURATION: 98 % | WEIGHT: 248.02 LBS | SYSTOLIC BLOOD PRESSURE: 148 MMHG | BODY MASS INDEX: 42.34 KG/M2 | HEART RATE: 76 BPM | HEIGHT: 64 IN

## 2023-12-23 DIAGNOSIS — R20.2 PARESTHESIAS: Primary | ICD-10-CM

## 2023-12-23 LAB
ABO GROUP BLD: NORMAL
ABO GROUP BLD: NORMAL
ALBUMIN SERPL-MCNC: 4.2 G/DL (ref 3.5–5.2)
ALBUMIN/GLOB SERPL: 1.4 G/DL
ALP SERPL-CCNC: 71 U/L (ref 39–117)
ALT SERPL W P-5'-P-CCNC: 30 U/L (ref 1–33)
ANION GAP SERPL CALCULATED.3IONS-SCNC: 10 MMOL/L (ref 5–15)
APTT PPP: 28.9 SECONDS (ref 24.2–34.2)
AST SERPL-CCNC: 25 U/L (ref 1–32)
BASOPHILS # BLD AUTO: 0.06 10*3/MM3 (ref 0–0.2)
BASOPHILS NFR BLD AUTO: 0.5 % (ref 0–1.5)
BILIRUB SERPL-MCNC: <0.2 MG/DL (ref 0–1.2)
BILIRUB UR QL STRIP: NEGATIVE
BLD GP AB SCN SERPL QL: NEGATIVE
BUN SERPL-MCNC: 15 MG/DL (ref 6–20)
BUN/CREAT SERPL: 20.8 (ref 7–25)
CALCIUM SPEC-SCNC: 9.3 MG/DL (ref 8.6–10.5)
CHLORIDE SERPL-SCNC: 102 MMOL/L (ref 98–107)
CLARITY UR: ABNORMAL
CO2 SERPL-SCNC: 25 MMOL/L (ref 22–29)
COLOR UR: YELLOW
CREAT SERPL-MCNC: 0.72 MG/DL (ref 0.57–1)
DEPRECATED RDW RBC AUTO: 38.8 FL (ref 37–54)
EGFRCR SERPLBLD CKD-EPI 2021: 109.2 ML/MIN/1.73
EOSINOPHIL # BLD AUTO: 0.31 10*3/MM3 (ref 0–0.4)
EOSINOPHIL NFR BLD AUTO: 2.8 % (ref 0.3–6.2)
ERYTHROCYTE [DISTWIDTH] IN BLOOD BY AUTOMATED COUNT: 12.9 % (ref 12.3–15.4)
GLOBULIN UR ELPH-MCNC: 3.1 GM/DL
GLUCOSE SERPL-MCNC: 119 MG/DL (ref 65–99)
GLUCOSE UR STRIP-MCNC: NEGATIVE MG/DL
HCT VFR BLD AUTO: 40 % (ref 34–46.6)
HGB BLD-MCNC: 12.9 G/DL (ref 12–15.9)
HGB UR QL STRIP.AUTO: NEGATIVE
HOLD SPECIMEN: NORMAL
HOLD SPECIMEN: NORMAL
IMM GRANULOCYTES # BLD AUTO: 0.06 10*3/MM3 (ref 0–0.05)
IMM GRANULOCYTES NFR BLD AUTO: 0.5 % (ref 0–0.5)
INR PPP: 0.96 (ref 0.86–1.15)
KETONES UR QL STRIP: NEGATIVE
LEUKOCYTE ESTERASE UR QL STRIP.AUTO: NEGATIVE
LYMPHOCYTES # BLD AUTO: 2.44 10*3/MM3 (ref 0.7–3.1)
LYMPHOCYTES NFR BLD AUTO: 22 % (ref 19.6–45.3)
MCH RBC QN AUTO: 26.5 PG (ref 26.6–33)
MCHC RBC AUTO-ENTMCNC: 32.3 G/DL (ref 31.5–35.7)
MCV RBC AUTO: 82.1 FL (ref 79–97)
MONOCYTES # BLD AUTO: 0.78 10*3/MM3 (ref 0.1–0.9)
MONOCYTES NFR BLD AUTO: 7 % (ref 5–12)
NEUTROPHILS NFR BLD AUTO: 67.2 % (ref 42.7–76)
NEUTROPHILS NFR BLD AUTO: 7.46 10*3/MM3 (ref 1.7–7)
NITRITE UR QL STRIP: NEGATIVE
NRBC BLD AUTO-RTO: 0 /100 WBC (ref 0–0.2)
PH UR STRIP.AUTO: 6.5 [PH] (ref 5–8)
PLATELET # BLD AUTO: 391 10*3/MM3 (ref 140–450)
PMV BLD AUTO: 9.4 FL (ref 6–12)
POTASSIUM SERPL-SCNC: 4.5 MMOL/L (ref 3.5–5.2)
PROT SERPL-MCNC: 7.3 G/DL (ref 6–8.5)
PROT UR QL STRIP: NEGATIVE
PROTHROMBIN TIME: 13 SECONDS (ref 11.8–14.9)
QT INTERVAL: 391 MS
QTC INTERVAL: 456 MS
RBC # BLD AUTO: 4.87 10*6/MM3 (ref 3.77–5.28)
RH BLD: POSITIVE
RH BLD: POSITIVE
SODIUM SERPL-SCNC: 137 MMOL/L (ref 136–145)
SP GR UR STRIP: 1.02 (ref 1–1.03)
T&S EXPIRATION DATE: NORMAL
TROPONIN T SERPL HS-MCNC: <6 NG/L
UROBILINOGEN UR QL STRIP: ABNORMAL
WBC NRBC COR # BLD AUTO: 11.11 10*3/MM3 (ref 3.4–10.8)
WHOLE BLOOD HOLD COAG: NORMAL
WHOLE BLOOD HOLD SPECIMEN: NORMAL

## 2023-12-23 PROCEDURE — 71045 X-RAY EXAM CHEST 1 VIEW: CPT

## 2023-12-23 PROCEDURE — 86900 BLOOD TYPING SEROLOGIC ABO: CPT

## 2023-12-23 PROCEDURE — 99204 OFFICE O/P NEW MOD 45 MIN: CPT | Performed by: STUDENT IN AN ORGANIZED HEALTH CARE EDUCATION/TRAINING PROGRAM

## 2023-12-23 PROCEDURE — 85730 THROMBOPLASTIN TIME PARTIAL: CPT | Performed by: EMERGENCY MEDICINE

## 2023-12-23 PROCEDURE — 85025 COMPLETE CBC W/AUTO DIFF WBC: CPT | Performed by: EMERGENCY MEDICINE

## 2023-12-23 PROCEDURE — 86900 BLOOD TYPING SEROLOGIC ABO: CPT | Performed by: EMERGENCY MEDICINE

## 2023-12-23 PROCEDURE — 84484 ASSAY OF TROPONIN QUANT: CPT | Performed by: EMERGENCY MEDICINE

## 2023-12-23 PROCEDURE — 99285 EMERGENCY DEPT VISIT HI MDM: CPT

## 2023-12-23 PROCEDURE — 82948 REAGENT STRIP/BLOOD GLUCOSE: CPT

## 2023-12-23 PROCEDURE — 86901 BLOOD TYPING SEROLOGIC RH(D): CPT | Performed by: EMERGENCY MEDICINE

## 2023-12-23 PROCEDURE — 80053 COMPREHEN METABOLIC PANEL: CPT | Performed by: EMERGENCY MEDICINE

## 2023-12-23 PROCEDURE — 93005 ELECTROCARDIOGRAM TRACING: CPT | Performed by: EMERGENCY MEDICINE

## 2023-12-23 PROCEDURE — 85610 PROTHROMBIN TIME: CPT | Performed by: EMERGENCY MEDICINE

## 2023-12-23 PROCEDURE — 81003 URINALYSIS AUTO W/O SCOPE: CPT | Performed by: EMERGENCY MEDICINE

## 2023-12-23 PROCEDURE — 86901 BLOOD TYPING SEROLOGIC RH(D): CPT

## 2023-12-23 PROCEDURE — 86850 RBC ANTIBODY SCREEN: CPT | Performed by: EMERGENCY MEDICINE

## 2023-12-23 PROCEDURE — 70450 CT HEAD/BRAIN W/O DYE: CPT

## 2023-12-23 RX ORDER — SODIUM CHLORIDE 0.9 % (FLUSH) 0.9 %
10 SYRINGE (ML) INJECTION AS NEEDED
Status: DISCONTINUED | OUTPATIENT
Start: 2023-12-23 | End: 2023-12-23 | Stop reason: HOSPADM

## 2023-12-23 NOTE — DISCHARGE INSTRUCTIONS
We did not complete your workup today in the emergency department.  The neurologist recommended treatment with IVIG and an MRI.  It is important that you follow-up with your primary care provider and/or neurologist as soon as possible.  Please return to the emergency department if your symptoms return or you have any additional concerns.

## 2023-12-23 NOTE — ED PROVIDER NOTES
Time: 1:15 AM EST  Date of encounter:  2023  Independent Historian/Clinical History and Information was obtained by:   Patient    History is limited by: N/A    Chief Complaint: Right facial numbness      History of Present Illness:  Patient is a 38 y.o. year old female who presents to the emergency department for evaluation of right facial numbness    Patient states that approximately 11 PM she had sudden onset of pain and numbness over the right side of her face.  She states the pain seems to originate from her right eye and radiates back through her head and down her neck.  She has associated numbness over the right side of her body.  She denies any weakness.  She is concerned as she has had previous episodes of stroke.  She denies head trauma.  No fever.    HPI    Patient Care Team  Primary Care Provider: Grecia Do APRN    Past Medical History:     Allergies   Allergen Reactions    Clindamycin/Lincomycin GI Intolerance    Solu-Medrol [Methylprednisolone Sodium Succ] Hives    Strawberry Extract Unknown - High Severity     Pt uncooperative with question, but noted in previous chart 2013    Lisinopril Swelling     Past Medical History:   Diagnosis Date    Anxiety     MS (multiple sclerosis)     RA (rheumatoid arthritis)     Stroke      Past Surgical History:   Procedure Laterality Date     SECTION       Family History   Problem Relation Age of Onset    Heart disease Mother     Heart disease Other        Home Medications:  Prior to Admission medications    Medication Sig Start Date End Date Taking? Authorizing Provider   aspirin 81 MG chewable tablet Chew 1 tablet Daily.    Yvonne Nuno MD   buPROPion XL (WELLBUTRIN XL) 150 MG 24 hr tablet Take 1 tablet by mouth Daily.    Yvonne Nuno MD   cloNIDine (CATAPRES) 0.1 MG tablet Take 1 tablet by mouth 2 (Two) Times a Day.    Yvonne Nuno MD   cloNIDine (CATAPRES) 0.2 MG tablet Take 1 tablet by mouth 2 (Two) Times a  "Day. 9/21/23   Maykel Arambula DO   FLUoxetine (PROzac) 20 MG capsule Take 1 capsule by mouth Daily.    ProviderYvonne MD   ibuprofen (ADVIL,MOTRIN) 200 MG tablet Take 1 tablet by mouth Every 6 (Six) Hours As Needed for Mild Pain.    ProviderYvonne MD   ketorolac (TORADOL) 10 MG tablet Take 1 tablet by mouth Every 6 (Six) Hours As Needed for Moderate Pain or Severe Pain. 9/9/23   Benedicto Alva MD   ondansetron ODT (ZOFRAN-ODT) 4 MG disintegrating tablet Place 1 tablet on the tongue Every 6 (Six) Hours As Needed for Nausea or Vomiting. 9/21/23   Maykel Arambula DO   risperiDONE (risperDAL) 1 MG tablet Take 1 tablet by mouth 2 (Two) Times a Day.    Provider, MD Yvonne        Social History:   Social History     Tobacco Use    Smoking status: Every Day     Packs/day: .5     Types: Cigarettes    Smokeless tobacco: Never   Vaping Use    Vaping Use: Never used   Substance Use Topics    Alcohol use: Never    Drug use: Never     Comment: Positive for amph/meth 2/10/23         Review of Systems:  Review of Systems   Constitutional:  Negative for chills and fever.   HENT:  Negative for congestion, ear pain and sore throat.    Eyes:  Negative for pain.   Respiratory:  Negative for cough, chest tightness and shortness of breath.    Cardiovascular:  Negative for chest pain.   Gastrointestinal:  Negative for abdominal pain, diarrhea, nausea and vomiting.   Genitourinary:  Negative for flank pain and hematuria.   Musculoskeletal:  Negative for joint swelling.   Skin:  Negative for pallor.   Neurological:  Positive for numbness and headaches. Negative for seizures.   All other systems reviewed and are negative.       Physical Exam:  /93   Pulse 76   Temp 99.8 °F (37.7 °C) (Oral)   Resp 16   Ht 162.6 cm (64\")   Wt 113 kg (248 lb 0.3 oz)   LMP 12/13/2023 (Approximate)   SpO2 98%   BMI 42.57 kg/m²     Physical Exam  Vitals and nursing note reviewed.   Constitutional:       General: She is not in acute " distress.     Appearance: Normal appearance. She is not toxic-appearing.   HENT:      Head: Normocephalic and atraumatic.      Jaw: There is normal jaw occlusion.   Eyes:      General: Lids are normal.      Extraocular Movements: Extraocular movements intact.      Conjunctiva/sclera: Conjunctivae normal.      Pupils: Pupils are equal, round, and reactive to light.   Cardiovascular:      Rate and Rhythm: Normal rate and regular rhythm.      Pulses: Normal pulses.      Heart sounds: Normal heart sounds.   Pulmonary:      Effort: Pulmonary effort is normal. No respiratory distress.      Breath sounds: Normal breath sounds. No wheezing or rhonchi.   Abdominal:      General: Abdomen is flat.      Palpations: Abdomen is soft.      Tenderness: There is no abdominal tenderness. There is no guarding or rebound.   Musculoskeletal:         General: Normal range of motion.      Cervical back: Normal range of motion and neck supple.      Right lower leg: No edema.      Left lower leg: No edema.   Skin:     General: Skin is warm and dry.   Neurological:      Mental Status: She is alert. Mental status is at baseline.      Comments: NIHSS = 2   Psychiatric:         Mood and Affect: Mood normal.                Procedures:  Procedures      Medical Decision Making:      Comorbidities that affect care:    MS, previous stroke, rheumatoid arthritis, anxiety    External Notes reviewed:    Hospital Discharge Summary: Psychiatric admission 2/13/2023 for depressive disorder      The following orders were placed and all results were independently analyzed by me:  Orders Placed This Encounter   Procedures    CT Head Without Contrast Stroke Protocol    XR Chest 1 View    Aurelia Draw    Comprehensive Metabolic Panel    Protime-INR    aPTT    Single High Sensitivity Troponin T    Urinalysis With Microscopic If Indicated (No Culture) - Urine, Clean Catch    CBC Auto Differential    Initiate Department's Acute Stroke Process (Team D, Code 19, etc)     Perform NIH Stroke Scale    Measure Actual Weight    Head of Bed 30 Degrees or Less    Continuous Pulse Oximetry    Vital Signs    Nursing Dysphagia Screening (Complete Prior to Giving anything PO)    RN to Place Order SLP Consult (IF swallow screen failed) - Eval & Treat Choosing Reason of RN Dysphagia Screen Failed    POC Glucose Once    ECG 12 Lead ED Triage Standing Order; Acute Stroke (Onset <12 hrs)    Type & Screen    ABO RH Specimen Verification    CBC & Differential    Green Top (Gel)    Lavender Top    Gold Top - SST    Light Blue Top       Medications Given in the Emergency Department:  Medications - No data to display       ED Course:    ED Course as of 12/23/23 0713   Sat Dec 23, 2023   0137 I discussed patient's initial workup and presentation with the teleneurologist who believes the patient's presentation is most consistent with a demyelinating disease given the patient's history of multiple sclerosis.  She recommends high-dose corticosteroids but given the patient's concerning allergy we will treat with IVIG instead.  Additionally she recommends admission for MRI with and without contrast. [JS]   0253 I discussed the neurologist recommendations with the patient but the patient wishes to be discharged home.  She states her symptoms have completely resolved.  She does not wish to be admitted and states that she will follow-up with her PCP and/or neurologist for further testing.  She will promptly return to the emergency department with any additional symptoms or concerns. [JS]      ED Course User Index  [JS] Glenroy Toledo MD       Labs:    Lab Results (last 24 hours)       Procedure Component Value Units Date/Time    CBC & Differential [[id number]]  (Abnormal) Collected: 12/23/23 0119    Specimen: Blood Updated: 12/23/23 0131    Narrative:      The following orders were created for panel order CBC & Differential.  Procedure                               Abnormality         Status                      ---------                               -----------         ------                     CBC Auto Differential[[id number]]        Abnormal            Final result                 Please view results for these tests on the individual orders.    Comprehensive Metabolic Panel [[id number]]  (Abnormal) Collected: 12/23/23 0119    Specimen: Blood Updated: 12/23/23 0151     Glucose 119 mg/dL      BUN 15 mg/dL      Creatinine 0.72 mg/dL      Sodium 137 mmol/L      Potassium 4.5 mmol/L      Chloride 102 mmol/L      CO2 25.0 mmol/L      Calcium 9.3 mg/dL      Total Protein 7.3 g/dL      Albumin 4.2 g/dL      ALT (SGPT) 30 U/L      AST (SGOT) 25 U/L      Alkaline Phosphatase 71 U/L      Total Bilirubin <0.2 mg/dL      Globulin 3.1 gm/dL      A/G Ratio 1.4 g/dL      BUN/Creatinine Ratio 20.8     Anion Gap 10.0 mmol/L      eGFR 109.2 mL/min/1.73     Narrative:      GFR Normal >60  Chronic Kidney Disease <60  Kidney Failure <15      Protime-INR [[id number]]  (Normal) Collected: 12/23/23 0119    Specimen: Blood Updated: 12/23/23 0148     Protime 13.0 Seconds      INR 0.96    Narrative:      Suggested Therapeutic Ranges For Oral Anticoagulant Therapy:  Level of Therapy                      INR Target Range  Standard Dose                            2.0-3.0  High Dose                                2.5-3.5  Patients not receiving anticoagulant  Therapy Normal Range                     0.86-1.15    aPTT [[id number]]  (Normal) Collected: 12/23/23 0119    Specimen: Blood Updated: 12/23/23 0148     PTT 28.9 seconds     Single High Sensitivity Troponin T [[id number]]  (Normal) Collected: 12/23/23 0119    Specimen: Blood Updated: 12/23/23 0148     HS Troponin T <6 ng/L     Narrative:      High Sensitive Troponin T Reference Range:  <14.0 ng/L- Negative Female for AMI  <22.0 ng/L- Negative Male for AMI  >=14 - Abnormal Female indicating possible myocardial injury.  >=22 - Abnormal Male indicating possible myocardial injury.   Clinicians would  have to utilize clinical acumen, EKG, Troponin, and serial changes to determine if it is an Acute Myocardial Infarction or myocardial injury due to an underlying chronic condition.         CBC Auto Differential [[id number]]  (Abnormal) Collected: 12/23/23 0119    Specimen: Blood Updated: 12/23/23 0131     WBC 11.11 10*3/mm3      RBC 4.87 10*6/mm3      Hemoglobin 12.9 g/dL      Hematocrit 40.0 %      MCV 82.1 fL      MCH 26.5 pg      MCHC 32.3 g/dL      RDW 12.9 %      RDW-SD 38.8 fl      MPV 9.4 fL      Platelets 391 10*3/mm3      Neutrophil % 67.2 %      Lymphocyte % 22.0 %      Monocyte % 7.0 %      Eosinophil % 2.8 %      Basophil % 0.5 %      Immature Grans % 0.5 %      Neutrophils, Absolute 7.46 10*3/mm3      Lymphocytes, Absolute 2.44 10*3/mm3      Monocytes, Absolute 0.78 10*3/mm3      Eosinophils, Absolute 0.31 10*3/mm3      Basophils, Absolute 0.06 10*3/mm3      Immature Grans, Absolute 0.06 10*3/mm3      nRBC 0.0 /100 WBC     Urinalysis With Microscopic If Indicated (No Culture) - Urine, Clean Catch [[id number]]  (Abnormal) Collected: 12/23/23 0139    Specimen: Urine, Clean Catch Updated: 12/23/23 0145     Color, UA Yellow     Appearance, UA Cloudy     pH, UA 6.5     Specific Gravity, UA 1.021     Glucose, UA Negative     Ketones, UA Negative     Bilirubin, UA Negative     Blood, UA Negative     Protein, UA Negative     Leuk Esterase, UA Negative     Nitrite, UA Negative     Urobilinogen, UA 0.2 E.U./dL    Narrative:      Urine microscopic not indicated.             Imaging:    XR Chest 1 View    Result Date: 12/23/2023  PROCEDURE: XR CHEST 1 VW  COMPARISON: 9/9/2023.  INDICATIONS: Acute Stroke Protocol (Onset < 12 hrs).  FINDINGS:  A single AP (or PA) upright portable chest radiograph was performed.  No cardiac enlargement is seen.  No acute infiltrate is appreciated.  No pleural effusion or pneumothorax is identified.  The imaged airway is patent and midline.  External artifacts are noted.  No significant  interval change is seen since the prior study (or studies).        No acute infiltrate is appreciated.     Please note that portions of this note were completed with a voice recognition program.  GINI TORIBIO JR, MD       Electronically Signed and Approved By: GINI TORIBIO JR, MD on 12/23/2023 at 2:31              CT Head Without Contrast Stroke Protocol    Result Date: 12/23/2023  PROCEDURE: CT HEAD WO CONTRAST STROKE PROTOCOL  COMPARISONS: 9/9/2023; 7/29/2018.  INDICATIONS: Neuro. deficit, acute; possible acute ischemic stroke (AIS).  PROTOCOL:   Standard CT imaging protocol performed.    RADIATION:   Total DLP: 1,018.2 mGy*cm.   MA and/or KV were/was adjusted to minimize radiation dose.    TECHNIQUE: After obtaining the patient's consent, 130 CT images were obtained without non-ionic intravenous contrast material.  DISCUSSION:   A routine nonenhanced head CT was performed.  No acute brain abnormality is identified.  No acute intracranial hemorrhage.  No acute infarct is seen.  No acute skull fracture.  No midline shift or acute intracranial mass effect is seen.  The ventricular size and configuration are within normal limits.  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  There is an incidental 4 mm pineal cyst, likely benign.  No significant acute findings are seen with regard to the imaged orbits, paranasal sinuses, or middle ear clefts.       No acute brain abnormality is seen. Specifically, no acute intracranial hemorrhage, no acute infarct, no significant intracranial mass lesion, and no acute intracranial mass effect are appreciated.  Please see above comments for further detail.    Please note that portions of this note were completed with a voice recognition program.  GINI TORIBIO JR, MD       Electronically Signed and Approved By: GINI TORIBIO JR, MD on 12/23/2023 at 1:29                 Differential Diagnosis and Discussion:    Stroke: Differential diagnosis in this patient with signs of possible  ischemic stroke include TIA or ischemic stroke, hemorrhagic stroke, hypoglycemic episode, toxic or metabolic encephalopathy, paresthesias.    All labs were reviewed and interpreted by me.  All X-rays impressions were independently interpreted by me.  EKG was interpreted by me.  CT scan radiology impression was interpreted by me.    [facility]               Patient Care Considerations:    I considered admission for treatment with IVIG and continued workup with MRI with and without contrast for the patient declined admission and wished to be discharged home and will follow-up outpatient.  Additionally she will return with any recurrence of her symptoms or any additional concerns.      Consultants/Shared Management Plan:    Consultant: I have discussed the case with teleneurology who agrees to consult on the patient.    Social Determinants of Health:    Patient is independent, reliable, and has access to care.       Disposition and Care Coordination:    Discharged: I considered escalation of care by admitting this patient for observation, however the patient has improved and is suitable and  stable for discharge.    I have explained the patient´s condition, diagnoses and treatment plan based on the information available to me at this time. I have answered questions and addressed any concerns. The patient has a good  understanding of the patient´s diagnosis, condition, and treatment plan as can be expected at this point. The vital signs have been stable. The patient´s condition is stable and appropriate for discharge from the emergency department.      The patient will pursue further outpatient evaluation with the primary care physician or other designated or consulting physician as outlined in the discharge instructions. They are agreeable to this plan of care and follow-up instructions have been explained in detail. The patient has received these instructions in written format and have expressed an understanding of the  discharge instructions. The patient is aware that any significant change in condition or worsening of symptoms should prompt an immediate return to this or the closest emergency department or call to 911.    Final diagnoses:   Paresthesias        ED Disposition       ED Disposition   Discharge    Condition   Stable    Comment   --               This medical record created using voice recognition software.             Glenroy Toledo MD  12/23/23 0730

## 2023-12-23 NOTE — CONSULTS
[facility]  [facility]      Patient Name:   Corinne Small  YOB: 1985  Identification Number:   MRN - [id number]  Date of Service:   12/23/2023 01:09:55    Diagnosis:        G35 - Multiple sclerosis        H46 - Optic neuritis    Impression:       37 yo female with MS presented with right vision loss that was painful and with right sided paresthesias of the face. I feel this fits more with a demyelinating disorder than stroke. Pt allergic to steroids as well. Discussed plan with her and ED physician.    Our recommendations are outlined below.    Recommendations:          Neuro Checks        Bedside Swallow Eval        DVT Prophylaxis        Head of Bed 30 Degrees        Euglycemia and Avoid Hyperthermia (PRN Acetaminophen)        IV methylpred 1g Qday x5 would be my first choice; however, she is allergic to steroids and was told to never use them again        IVIG or PLEX are sometimes used when disease process is not responsive to steroids; these treatments do not have the evidence that steroids have, but these are really the best options acutely until we can get her on a DMD hopefully - IVIG is typically more readily available, so would go with that for now        Imaging to help guide treatment as well (MRI w/wo)        Consult ophthalmology when available to rule out other ophthalmologic causes    Recommended Scan:       MRI Head with and Without Contrast    Dysphagia:        Swallow Evaluation, Bedside        NPO Until Swallow Evaluation    DVT prophylaxis:        Choice of Primary Team    Sign Out:        Discussed with Emergency Department Provider        ------------------------------------------------------------------------------    Advanced Imaging:  Advanced Imaging Deferred because:    Stroke not suspected with clinical presentation and exam      Metrics:  Last Known Well: 12/22/2023 10:30:00  [facility] Notification Time: 12/23/2023  01:08:28  Arrival Time: 12/23/2023 01:08:00  Stamp Time: 12/23/2023 01:09:55  Initial Response Time: 12/23/2023 01:14:18  Symptoms: Right vision loss.  Initial patient interaction: 12/23/2023 01:18:08  NIHSS Assessment Completed: 12/23/2023 01:24:33  Patient is not a candidate for Thrombolytic.  Thrombolytic Medical Decision: 12/23/2023 01:24:33  Patient was not deemed candidate for Thrombolytic because of following reasons:  MS/demyelinating flare.    I personally Reviewed the CT Head and it Showed no acute findings    Primary Provider Notified of Diagnostic Impression and Management Plan on: 12/23/2023 01:36:45        ------------------------------------------------------------------------------    History of Present Illness:  Patient is a 38 year old Female.    Patient was brought by EMS for symptoms of Right vision loss.  39 yo female with history of MS and strokes who presented to the ED with right eye pain, with right face numbness/tingling and painful right eye vision loss. Sharp stabbing pain in the right eye. Started puking and started tumbling as well. This is not typical of her prior strokes she tells me. Has had vision issues in the past but not like this. No history of migraines, no current headache, just the eye pain.    CT head reviewed and unremarkable. I briefly discussed thrombolytics with her and explained that this is not consistent with stroke and would not recommend that route. She voiced understanding of that and we discussed MS management. She is allergic to steroids and had severe whole body bruising last time she had IV methylpred and bled from all the bruises. She was told not to ever take PO or IV steroids again, so she wants to manage the flare with something else.      Past Medical History:       Stroke       There is no history of Seizures       There is no history of Migraine Headaches  Othere PMH:  Multiple sclerosis    Medications:    No Anticoagulant use   Antiplatelet use: Yes  ASA  Reviewed EMR for current medications    Allergies:   Reviewed    Social History:  Smoking: No    Family History:    There is no family history of premature cerebrovascular disease pertinent to this consultation    ROS :  14 Points Review of Systems was performed and was negative except mentioned in HPI.    Past Surgical History:  There Is No Surgical History Contributory To Today’s Visit        Examination:  BP(133/93), Pulse(86),  1A: Level of Consciousness - Alert; keenly responsive + 0  1B: Ask Month and Age - Both Questions Right + 0  1C: Blink Eyes & Squeeze Hands - Performs Both Tasks + 0  2: Test Horizontal Extraocular Movements - Normal + 0  3: Test Visual Fields - No Visual Loss + 0  4: Test Facial Palsy (Use Grimace if Obtunded) - Normal symmetry + 0  5A: Test Left Arm Motor Drift - No Drift for 10 Seconds + 0  5B: Test Right Arm Motor Drift - No Drift for 10 Seconds + 0  6A: Test Left Leg Motor Drift - No Drift for 5 Seconds + 0  6B: Test Right Leg Motor Drift - Drift, but doesn't hit bed + 1  7: Test Limb Ataxia (FNF/Heel-Shin) - No Ataxia + 0  8: Test Sensation - Normal; No sensory loss + 0  9: Test Language/Aphasia - Normal; No aphasia + 0  10: Test Dysarthria - Normal + 0  11: Test Extinction/Inattention - No abnormality + 0    NIHSS Score: 1  NIHSS Free Text : No vision out of right eye, complete vision out of left eye   Cannot see colors or bright lights from right eye   Pupil reactive on right, difficult to evaluate APD over video   Right leg weakness is baseline from prior stroke    Pre-Morbid Modified [place] Scale:  1 Points = No significant disability despite symptoms; able to carry out all usual duties and activities    Spoke with : Dr. Arambula    Patient/Family was informed the Neurology Consult would occur via TeleHealth consult by way of interactive audio and video telecommunications and consented to receiving care in this manner.      Patient is being evaluated for possible acute  neurologic impairment and high probability of imminent or life-threatening deterioration. I spent total of 35 minutes providing care to this patient, including time for face to face visit via telemedicine, review of medical records, imaging studies and discussion of findings with providers, the patient and/or family.      Katy Horton MD    [facility]  For Inpatient follow-up with [facility] physician please call [facility] [phone]. This is not an outpatient service. Post hospital discharge, please contact hospital directly.    Please do not communicate with [facility] physicians via secure chat. If you have any questions, Please contact [facility].

## 2023-12-23 NOTE — Clinical Note
[facility] EMERGENCY ROOM  [address]  [place] [address]  Phone: [phone]    Corinne Small was seen and treated in our emergency department on 12/23/2023.  She may return to work on 12/26/2023.         Thank you for choosing [facility].    Glenroy Toledo MD

## 2023-12-26 LAB — GLUCOSE BLDC GLUCOMTR-MCNC: 120 MG/DL (ref 70–99)

## 2024-01-03 LAB
QT INTERVAL: 391 MS
QTC INTERVAL: 456 MS

## 2024-02-14 ENCOUNTER — LAB (OUTPATIENT)
Dept: LAB | Facility: HOSPITAL | Age: 39
End: 2024-02-14
Payer: COMMERCIAL

## 2024-02-14 ENCOUNTER — TRANSCRIBE ORDERS (OUTPATIENT)
Dept: ADMINISTRATIVE | Facility: HOSPITAL | Age: 39
End: 2024-02-14
Payer: COMMERCIAL

## 2024-02-14 DIAGNOSIS — R41.3 MEMORY LOSS: Primary | ICD-10-CM

## 2024-02-14 DIAGNOSIS — R41.3 MEMORY LOSS: ICD-10-CM

## 2024-02-14 LAB
ALBUMIN SERPL-MCNC: 4.1 G/DL (ref 3.5–5.2)
ALBUMIN/GLOB SERPL: 1.4 G/DL
ALP SERPL-CCNC: 76 U/L (ref 39–117)
ALT SERPL W P-5'-P-CCNC: 22 U/L (ref 1–33)
ANION GAP SERPL CALCULATED.3IONS-SCNC: 11.3 MMOL/L (ref 5–15)
AST SERPL-CCNC: 20 U/L (ref 1–32)
BASOPHILS # BLD AUTO: 0.04 10*3/MM3 (ref 0–0.2)
BASOPHILS NFR BLD AUTO: 0.5 % (ref 0–1.5)
BILIRUB SERPL-MCNC: 0.3 MG/DL (ref 0–1.2)
BUN SERPL-MCNC: 9 MG/DL (ref 6–20)
BUN/CREAT SERPL: 11.8 (ref 7–25)
CALCIUM SPEC-SCNC: 8.9 MG/DL (ref 8.6–10.5)
CHLORIDE SERPL-SCNC: 104 MMOL/L (ref 98–107)
CO2 SERPL-SCNC: 23.7 MMOL/L (ref 22–29)
CREAT SERPL-MCNC: 0.76 MG/DL (ref 0.57–1)
DEPRECATED RDW RBC AUTO: 39.9 FL (ref 37–54)
EGFRCR SERPLBLD CKD-EPI 2021: 102.4 ML/MIN/1.73
EOSINOPHIL # BLD AUTO: 0.31 10*3/MM3 (ref 0–0.4)
EOSINOPHIL NFR BLD AUTO: 3.6 % (ref 0.3–6.2)
ERYTHROCYTE [DISTWIDTH] IN BLOOD BY AUTOMATED COUNT: 13.7 % (ref 12.3–15.4)
ERYTHROCYTE [SEDIMENTATION RATE] IN BLOOD: 22 MM/HR (ref 0–20)
FOLATE SERPL-MCNC: 9.64 NG/ML (ref 4.78–24.2)
GLOBULIN UR ELPH-MCNC: 3 GM/DL
GLUCOSE SERPL-MCNC: 111 MG/DL (ref 65–99)
HCT VFR BLD AUTO: 38.2 % (ref 34–46.6)
HCYS SERPL-MCNC: 8.6 UMOL/L (ref 0–15)
HGB BLD-MCNC: 12.4 G/DL (ref 12–15.9)
HIV 1+2 AB+HIV1 P24 AG SERPL QL IA: NORMAL
IMM GRANULOCYTES # BLD AUTO: 0.03 10*3/MM3 (ref 0–0.05)
IMM GRANULOCYTES NFR BLD AUTO: 0.4 % (ref 0–0.5)
LYMPHOCYTES # BLD AUTO: 1.58 10*3/MM3 (ref 0.7–3.1)
LYMPHOCYTES NFR BLD AUTO: 18.6 % (ref 19.6–45.3)
MCH RBC QN AUTO: 26.4 PG (ref 26.6–33)
MCHC RBC AUTO-ENTMCNC: 32.5 G/DL (ref 31.5–35.7)
MCV RBC AUTO: 81.4 FL (ref 79–97)
MONOCYTES # BLD AUTO: 0.49 10*3/MM3 (ref 0.1–0.9)
MONOCYTES NFR BLD AUTO: 5.8 % (ref 5–12)
NEUTROPHILS NFR BLD AUTO: 6.05 10*3/MM3 (ref 1.7–7)
NEUTROPHILS NFR BLD AUTO: 71.1 % (ref 42.7–76)
NRBC BLD AUTO-RTO: 0 /100 WBC (ref 0–0.2)
PLATELET # BLD AUTO: 388 10*3/MM3 (ref 140–450)
PMV BLD AUTO: 10 FL (ref 6–12)
POTASSIUM SERPL-SCNC: 4.8 MMOL/L (ref 3.5–5.2)
PROT SERPL-MCNC: 7.1 G/DL (ref 6–8.5)
RBC # BLD AUTO: 4.69 10*6/MM3 (ref 3.77–5.28)
SODIUM SERPL-SCNC: 139 MMOL/L (ref 136–145)
T4 SERPL-MCNC: 6.46 MCG/DL (ref 4.5–11.7)
TSH SERPL DL<=0.05 MIU/L-ACNC: 1.65 UIU/ML (ref 0.27–4.2)
VIT B12 BLD-MCNC: 381 PG/ML (ref 211–946)
WBC NRBC COR # BLD AUTO: 8.5 10*3/MM3 (ref 3.4–10.8)

## 2024-02-14 PROCEDURE — 82607 VITAMIN B-12: CPT

## 2024-02-14 PROCEDURE — 36415 COLL VENOUS BLD VENIPUNCTURE: CPT

## 2024-02-14 PROCEDURE — 86593 SYPHILIS TEST NON-TREP QUANT: CPT

## 2024-02-14 PROCEDURE — 84436 ASSAY OF TOTAL THYROXINE: CPT

## 2024-02-14 PROCEDURE — G0432 EIA HIV-1/HIV-2 SCREEN: HCPCS

## 2024-02-14 PROCEDURE — 82746 ASSAY OF FOLIC ACID SERUM: CPT

## 2024-02-14 PROCEDURE — 86618 LYME DISEASE ANTIBODY: CPT

## 2024-02-14 PROCEDURE — 83090 ASSAY OF HOMOCYSTEINE: CPT

## 2024-02-14 PROCEDURE — 80053 COMPREHEN METABOLIC PANEL: CPT

## 2024-02-14 PROCEDURE — 84443 ASSAY THYROID STIM HORMONE: CPT

## 2024-02-14 PROCEDURE — 83921 ORGANIC ACID SINGLE QUANT: CPT

## 2024-02-14 PROCEDURE — 85025 COMPLETE CBC W/AUTO DIFF WBC: CPT

## 2024-02-14 PROCEDURE — 85652 RBC SED RATE AUTOMATED: CPT

## 2024-02-15 LAB
B BURGDOR IGG+IGM SER QL IA: NEGATIVE
RPR SER-TITR: NON REACTIVE TITER

## 2024-02-16 LAB
AMPAR1 AB SER QL IF: NEGATIVE
AMPAR2 AB SER QL IF: NEGATIVE
AMPHIPHYSIN AB SER QL: NEGATIVE
CASPR2 IGG SERPL QL IF: NEGATIVE
CV2 IGG SER-ACNC: NEGATIVE
DPPX IGG SERPL QL IF: NEGATIVE
GABABR AB SER QL IF: NEGATIVE
GAD65 IGG+IGM SER IA-SCNC: NEGATIVE NMOL/L
GLIAL NUC TYPE 1 AB SER QL IF: NEGATIVE
HU AB SER QL IF: NEGATIVE
HU2 AB SER QL IF: NEGATIVE
HU3 AB SER QL: NEGATIVE
IGLON5 IGG SER QL IF: NEGATIVE
IMP & REVIEW OF LAB RESULTS: NEGATIVE
LGI1 IGG SERPL QL IF: NEGATIVE
MGLUR1 IGG SER QL IF: NEGATIVE
NMDAR1 AB SER QL IF: NEGATIVE
PCA-1 AB SER QL IF: NEGATIVE
PCA-2 AB SER QL IF: NEGATIVE
PCA-TR AB SER QL IF: NEGATIVE

## 2024-02-20 LAB — METHYLMALONATE SERPL-SCNC: 155 NMOL/L (ref 0–378)

## 2024-02-28 ENCOUNTER — APPOINTMENT (OUTPATIENT)
Dept: CT IMAGING | Facility: HOSPITAL | Age: 39
End: 2024-02-28
Payer: COMMERCIAL

## 2024-02-28 ENCOUNTER — APPOINTMENT (OUTPATIENT)
Dept: GENERAL RADIOLOGY | Facility: HOSPITAL | Age: 39
End: 2024-02-28
Payer: COMMERCIAL

## 2024-02-28 ENCOUNTER — HOSPITAL ENCOUNTER (EMERGENCY)
Facility: HOSPITAL | Age: 39
Discharge: HOME | End: 2024-02-28
Attending: EMERGENCY MEDICINE | Admitting: EMERGENCY MEDICINE
Payer: COMMERCIAL

## 2024-02-28 VITALS
BODY MASS INDEX: 47.05 KG/M2 | TEMPERATURE: 98.5 F | HEART RATE: 71 BPM | OXYGEN SATURATION: 98 % | WEIGHT: 275.57 LBS | HEIGHT: 64 IN | RESPIRATION RATE: 22 BRPM | SYSTOLIC BLOOD PRESSURE: 112 MMHG | DIASTOLIC BLOOD PRESSURE: 66 MMHG

## 2024-02-28 DIAGNOSIS — R07.9 CHEST PAIN, UNSPECIFIED TYPE: ICD-10-CM

## 2024-02-28 DIAGNOSIS — G43.809 OTHER MIGRAINE WITHOUT STATUS MIGRAINOSUS, NOT INTRACTABLE: Primary | ICD-10-CM

## 2024-02-28 LAB
ALBUMIN SERPL-MCNC: 3.6 G/DL (ref 3.5–5.2)
ALBUMIN/GLOB SERPL: 1.2 G/DL
ALP SERPL-CCNC: 66 U/L (ref 39–117)
ALT SERPL W P-5'-P-CCNC: 19 U/L (ref 1–33)
ANION GAP SERPL CALCULATED.3IONS-SCNC: 10.1 MMOL/L (ref 5–15)
AST SERPL-CCNC: 15 U/L (ref 1–32)
BACTERIA UR QL AUTO: ABNORMAL /HPF
BASOPHILS # BLD AUTO: 0.05 10*3/MM3 (ref 0–0.2)
BASOPHILS NFR BLD AUTO: 0.6 % (ref 0–1.5)
BILIRUB SERPL-MCNC: <0.2 MG/DL (ref 0–1.2)
BILIRUB UR QL STRIP: NEGATIVE
BUN SERPL-MCNC: 9 MG/DL (ref 6–20)
BUN/CREAT SERPL: 12.9 (ref 7–25)
CALCIUM SPEC-SCNC: 8.6 MG/DL (ref 8.6–10.5)
CHLORIDE SERPL-SCNC: 104 MMOL/L (ref 98–107)
CLARITY UR: CLEAR
CO2 SERPL-SCNC: 23.9 MMOL/L (ref 22–29)
COLOR UR: YELLOW
CREAT SERPL-MCNC: 0.7 MG/DL (ref 0.57–1)
DEPRECATED RDW RBC AUTO: 40.7 FL (ref 37–54)
EGFRCR SERPLBLD CKD-EPI 2021: 113 ML/MIN/1.73
EOSINOPHIL # BLD AUTO: 0.32 10*3/MM3 (ref 0–0.4)
EOSINOPHIL NFR BLD AUTO: 4 % (ref 0.3–6.2)
ERYTHROCYTE [DISTWIDTH] IN BLOOD BY AUTOMATED COUNT: 13.9 % (ref 12.3–15.4)
GLOBULIN UR ELPH-MCNC: 3.1 GM/DL
GLUCOSE SERPL-MCNC: 137 MG/DL (ref 65–99)
GLUCOSE UR STRIP-MCNC: NEGATIVE MG/DL
HCG INTACT+B SERPL-ACNC: <0.5 MIU/ML
HCT VFR BLD AUTO: 37.6 % (ref 34–46.6)
HGB BLD-MCNC: 12 G/DL (ref 12–15.9)
HGB UR QL STRIP.AUTO: ABNORMAL
HOLD SPECIMEN: NORMAL
HOLD SPECIMEN: NORMAL
HYALINE CASTS UR QL AUTO: ABNORMAL /LPF
IMM GRANULOCYTES # BLD AUTO: 0.03 10*3/MM3 (ref 0–0.05)
IMM GRANULOCYTES NFR BLD AUTO: 0.4 % (ref 0–0.5)
KETONES UR QL STRIP: NEGATIVE
LEUKOCYTE ESTERASE UR QL STRIP.AUTO: NEGATIVE
LIPASE SERPL-CCNC: 30 U/L (ref 13–60)
LYMPHOCYTES # BLD AUTO: 1.63 10*3/MM3 (ref 0.7–3.1)
LYMPHOCYTES NFR BLD AUTO: 20.5 % (ref 19.6–45.3)
MAGNESIUM SERPL-MCNC: 2 MG/DL (ref 1.6–2.6)
MCH RBC QN AUTO: 26 PG (ref 26.6–33)
MCHC RBC AUTO-ENTMCNC: 31.9 G/DL (ref 31.5–35.7)
MCV RBC AUTO: 81.6 FL (ref 79–97)
MONOCYTES # BLD AUTO: 0.55 10*3/MM3 (ref 0.1–0.9)
MONOCYTES NFR BLD AUTO: 6.9 % (ref 5–12)
NEUTROPHILS NFR BLD AUTO: 5.37 10*3/MM3 (ref 1.7–7)
NEUTROPHILS NFR BLD AUTO: 67.6 % (ref 42.7–76)
NITRITE UR QL STRIP: NEGATIVE
NRBC BLD AUTO-RTO: 0 /100 WBC (ref 0–0.2)
NT-PROBNP SERPL-MCNC: <36 PG/ML (ref 0–450)
PH UR STRIP.AUTO: 6 [PH] (ref 5–8)
PLATELET # BLD AUTO: 355 10*3/MM3 (ref 140–450)
PMV BLD AUTO: 9.7 FL (ref 6–12)
POTASSIUM SERPL-SCNC: 3.9 MMOL/L (ref 3.5–5.2)
PROT SERPL-MCNC: 6.7 G/DL (ref 6–8.5)
PROT UR QL STRIP: NEGATIVE
QT INTERVAL: 394 MS
QTC INTERVAL: 444 MS
RBC # BLD AUTO: 4.61 10*6/MM3 (ref 3.77–5.28)
RBC # UR STRIP: ABNORMAL /HPF
REF LAB TEST METHOD: ABNORMAL
SODIUM SERPL-SCNC: 138 MMOL/L (ref 136–145)
SP GR UR STRIP: 1.01 (ref 1–1.03)
SQUAMOUS #/AREA URNS HPF: ABNORMAL /HPF
TROPONIN T SERPL HS-MCNC: <6 NG/L
UROBILINOGEN UR QL STRIP: ABNORMAL
WBC # UR STRIP: ABNORMAL /HPF
WBC NRBC COR # BLD AUTO: 7.95 10*3/MM3 (ref 3.4–10.8)
WHOLE BLOOD HOLD COAG: NORMAL
WHOLE BLOOD HOLD SPECIMEN: NORMAL
YEAST URNS QL MICRO: ABNORMAL /HPF

## 2024-02-28 PROCEDURE — 83880 ASSAY OF NATRIURETIC PEPTIDE: CPT

## 2024-02-28 PROCEDURE — 85025 COMPLETE CBC W/AUTO DIFF WBC: CPT

## 2024-02-28 PROCEDURE — 25010000002 KETOROLAC TROMETHAMINE PER 15 MG

## 2024-02-28 PROCEDURE — 70450 CT HEAD/BRAIN W/O DYE: CPT

## 2024-02-28 PROCEDURE — 71045 X-RAY EXAM CHEST 1 VIEW: CPT

## 2024-02-28 PROCEDURE — 93005 ELECTROCARDIOGRAM TRACING: CPT

## 2024-02-28 PROCEDURE — 96375 TX/PRO/DX INJ NEW DRUG ADDON: CPT

## 2024-02-28 PROCEDURE — 84702 CHORIONIC GONADOTROPIN TEST: CPT

## 2024-02-28 PROCEDURE — 93005 ELECTROCARDIOGRAM TRACING: CPT | Performed by: EMERGENCY MEDICINE

## 2024-02-28 PROCEDURE — 83690 ASSAY OF LIPASE: CPT

## 2024-02-28 PROCEDURE — 25010000002 METOCLOPRAMIDE PER 10 MG

## 2024-02-28 PROCEDURE — 81001 URINALYSIS AUTO W/SCOPE: CPT

## 2024-02-28 PROCEDURE — 84484 ASSAY OF TROPONIN QUANT: CPT

## 2024-02-28 PROCEDURE — 99284 EMERGENCY DEPT VISIT MOD MDM: CPT

## 2024-02-28 PROCEDURE — 25010000002 DIPHENHYDRAMINE PER 50 MG

## 2024-02-28 PROCEDURE — 80053 COMPREHEN METABOLIC PANEL: CPT

## 2024-02-28 PROCEDURE — 96374 THER/PROPH/DIAG INJ IV PUSH: CPT

## 2024-02-28 PROCEDURE — 25810000003 SODIUM CHLORIDE 0.9 % SOLUTION

## 2024-02-28 PROCEDURE — 83735 ASSAY OF MAGNESIUM: CPT

## 2024-02-28 RX ORDER — SODIUM CHLORIDE 0.9 % (FLUSH) 0.9 %
10 SYRINGE (ML) INJECTION AS NEEDED
Status: DISCONTINUED | OUTPATIENT
Start: 2024-02-28 | End: 2024-02-28 | Stop reason: HOSPADM

## 2024-02-28 RX ORDER — ASPIRIN 81 MG/1
324 TABLET, CHEWABLE ORAL ONCE
Status: DISCONTINUED | OUTPATIENT
Start: 2024-02-28 | End: 2024-02-28 | Stop reason: HOSPADM

## 2024-02-28 RX ORDER — METOCLOPRAMIDE HYDROCHLORIDE 5 MG/ML
10 INJECTION INTRAMUSCULAR; INTRAVENOUS ONCE
Status: COMPLETED | OUTPATIENT
Start: 2024-02-28 | End: 2024-02-28

## 2024-02-28 RX ORDER — DIPHENHYDRAMINE HYDROCHLORIDE 50 MG/ML
25 INJECTION INTRAMUSCULAR; INTRAVENOUS ONCE
Status: COMPLETED | OUTPATIENT
Start: 2024-02-28 | End: 2024-02-28

## 2024-02-28 RX ORDER — KETOROLAC TROMETHAMINE 30 MG/ML
30 INJECTION, SOLUTION INTRAMUSCULAR; INTRAVENOUS ONCE
Status: COMPLETED | OUTPATIENT
Start: 2024-02-28 | End: 2024-02-28

## 2024-02-28 RX ADMIN — SODIUM CHLORIDE 500 ML: 9 INJECTION, SOLUTION INTRAVENOUS at 15:33

## 2024-02-28 RX ADMIN — DIPHENHYDRAMINE HYDROCHLORIDE 25 MG: 50 INJECTION, SOLUTION INTRAMUSCULAR; INTRAVENOUS at 15:35

## 2024-02-28 RX ADMIN — KETOROLAC TROMETHAMINE 30 MG: 30 INJECTION, SOLUTION INTRAMUSCULAR; INTRAVENOUS at 15:34

## 2024-02-28 RX ADMIN — METOCLOPRAMIDE HYDROCHLORIDE 10 MG: 5 INJECTION INTRAMUSCULAR; INTRAVENOUS at 15:36

## 2024-02-28 NOTE — DISCHARGE INSTRUCTIONS
All your lab work, cardiac markers, EKG and chest x-ray within normal  CT of the head was negative  Urine was negative for UTI    Please follow-up with your PCP/neurologist

## 2024-02-28 NOTE — ED PROVIDER NOTES
Time: 2:54 PM EST  Date of encounter:  2024  Independent Historian/Clinical History and Information was obtained by:   Patient    History is limited by: N/A    Chief Complaint   Patient presents with    Chest Pain    Headache         History of Present Illness:  Patient is a 38 y.o. year old female who presents to the emergency department for evaluation of headache for the past 2 days.  Patient states he has a history of migraines and normally takes amitriptyline and Excedrin Migraine however she had no relief with these.  She called her neurosurgeon who told her to take a clonidine and recheck her blood pressure.  Her first blood pressure was 122/106 and after she took a 0.5 clonidine and she rechecked it was 131/127.  Her neurologist advised her to come to the ED.  Patient states she has a history of a stroke and was having some mild chest tightness that was sharp.  States that she is nauseous but denies vomiting.  Admits to photosensitivity with blurry vision.    Patient Care Team  Primary Care Provider: Grecia Do APRN    Past Medical History:     Allergies   Allergen Reactions    Clindamycin/Lincomycin GI Intolerance    Solu-Medrol [Methylprednisolone Sodium Succ] Hives    Strawberry Extract Unknown - High Severity     Pt uncooperative with question, but noted in previous chart 2013    Lisinopril Swelling     Past Medical History:   Diagnosis Date    Anxiety     MS (multiple sclerosis)     RA (rheumatoid arthritis)     Stroke      Past Surgical History:   Procedure Laterality Date     SECTION       Family History   Problem Relation Age of Onset    Heart disease Mother     Heart disease Other        Home Medications:  Prior to Admission medications    Medication Sig Start Date End Date Taking? Authorizing Provider   aspirin 81 MG chewable tablet Chew 1 tablet Daily.    Provider, Historical, MD   buPROPion XL (WELLBUTRIN XL) 150 MG 24 hr tablet Take 1 tablet by mouth Daily.    Provider,  "MD Yvonne   cloNIDine (CATAPRES) 0.1 MG tablet Take 1 tablet by mouth 2 (Two) Times a Day.    Yvonne Nuno MD   cloNIDine (CATAPRES) 0.2 MG tablet Take 1 tablet by mouth 2 (Two) Times a Day. 9/21/23   Maykel Arambula DO   FLUoxetine (PROzac) 20 MG capsule Take 1 capsule by mouth Daily.    Yvonne Nuno MD   ibuprofen (ADVIL,MOTRIN) 200 MG tablet Take 1 tablet by mouth Every 6 (Six) Hours As Needed for Mild Pain.    Yvonne Nuno MD   ketorolac (TORADOL) 10 MG tablet Take 1 tablet by mouth Every 6 (Six) Hours As Needed for Moderate Pain or Severe Pain. 9/9/23   Benedicto Alva MD   ondansetron ODT (ZOFRAN-ODT) 4 MG disintegrating tablet Place 1 tablet on the tongue Every 6 (Six) Hours As Needed for Nausea or Vomiting. 9/21/23   Maykel Arambula DO   risperiDONE (risperDAL) 1 MG tablet Take 1 tablet by mouth 2 (Two) Times a Day.    ProviderYvonne MD        Social History:   Social History     Tobacco Use    Smoking status: Every Day     Packs/day: .5     Types: Cigarettes    Smokeless tobacco: Never   Vaping Use    Vaping Use: Never used   Substance Use Topics    Alcohol use: Never    Drug use: Never     Comment: Positive for amph/meth 2/10/23         Review of Systems:  Review of Systems   Constitutional: Negative.    HENT: Negative.     Eyes:  Positive for photophobia and visual disturbance.   Respiratory:  Positive for chest tightness.    Cardiovascular:  Positive for chest pain.   Gastrointestinal:  Positive for nausea. Negative for vomiting.   Endocrine: Negative.    Genitourinary: Negative.    Musculoskeletal: Negative.    Skin: Negative.    Allergic/Immunologic: Negative.    Neurological:  Positive for headaches.   Hematological: Negative.    Psychiatric/Behavioral: Negative.          Physical Exam:  /66   Pulse 71   Temp 98.5 °F (36.9 °C) (Oral)   Resp 22   Ht 162.6 cm (64\")   Wt 125 kg (275 lb 9.2 oz)   SpO2 98%   BMI 47.30 kg/m²         Physical Exam  Vitals and " nursing note reviewed.   Constitutional:       Appearance: Normal appearance. She is normal weight.   HENT:      Head: Normocephalic and atraumatic.      Nose: Nose normal.      Mouth/Throat:      Mouth: Mucous membranes are moist.   Eyes:      General:         Right eye: No discharge.         Left eye: No discharge.      Extraocular Movements: Extraocular movements intact.      Right eye: Normal extraocular motion and no nystagmus.      Left eye: Normal extraocular motion and no nystagmus.      Conjunctiva/sclera: Conjunctivae normal.      Pupils: Pupils are equal, round, and reactive to light.   Cardiovascular:      Rate and Rhythm: Normal rate and regular rhythm.      Heart sounds: Normal heart sounds.   Pulmonary:      Effort: Pulmonary effort is normal.      Breath sounds: Normal breath sounds.   Musculoskeletal:         General: Normal range of motion.      Cervical back: Normal range of motion and neck supple.   Skin:     General: Skin is warm and dry.   Neurological:      General: No focal deficit present.      Mental Status: She is alert and oriented to person, place, and time.      Cranial Nerves: No cranial nerve deficit.      Sensory: No sensory deficit.      Motor: No weakness.      Coordination: Coordination normal.      Gait: Gait normal.   Psychiatric:         Mood and Affect: Mood normal.         Behavior: Behavior normal.              Procedures:  Procedures      Medical Decision Making:      Comorbidities that affect care:    Migraines, MS, stroke, anxiety    External Notes reviewed:    Previous ED Note: Multiple ED visits, last 1 on 12/23/2023 for paresthesias, before that 9/21/2023 for essential hypertension and headache      The following orders were placed and all results were independently analyzed by me:  Orders Placed This Encounter   Procedures    XR Chest 1 View    CT Head Without Contrast    [place] Draw    High Sensitivity Troponin T    Comprehensive Metabolic Panel    Lipase    BNP     Magnesium    CBC Auto Differential    Urinalysis With Microscopic If Indicated (No Culture) - Urine, Clean Catch    hCG, Quantitative, Pregnancy    Urinalysis, Microscopic Only - Urine, Clean Catch    NPO Diet NPO Type: Strict NPO    Undress & Gown    Continuous Pulse Oximetry    Oxygen Therapy- Nasal Cannula; Titrate 1-6 LPM Per SpO2; 90 - 95%    Insert Peripheral IV    CBC & Differential    Green Top (Gel)    Lavender Top    Gold Top - SST    Light Blue Top       Medications Given in the Emergency Department:  Medications   sodium chloride 0.9 % flush 10 mL (has no administration in time range)   aspirin chewable tablet 324 mg (0 mg Oral Hold 2/28/24 1316)   ketorolac (TORADOL) injection 30 mg (30 mg Intravenous Given 2/28/24 1534)   metoclopramide (REGLAN) injection 10 mg (10 mg Intravenous Given 2/28/24 1536)   diphenhydrAMINE (BENADRYL) injection 25 mg (25 mg Intravenous Given 2/28/24 1535)   sodium chloride 0.9 % bolus 500 mL (500 mL Intravenous New Bag 2/28/24 1533)        ED Course:    The patient was initially evaluated in the triage area where orders were placed. The patient was later dispositioned by Jose Strauss PA-C.      The patient was advised to stay for completion of workup which includes but is not limited to communication of labs and radiological results, reassessment and plan. The patient was advised that leaving prior to disposition by a provider could result in critical findings that are not communicated to the patient.     ED Course as of 02/28/24 1612 Wed Feb 28, 2024   1611 Patient states her headache is improved.  She is ready to be discharged [AJ]      ED Course User Index  [AJ] Jose Strauss PA-C       Labs:    Lab Results (last 24 hours)       Procedure Component Value Units Date/Time    High Sensitivity Troponin T [[id number]]  (Normal) Collected: 02/28/24 1313    Specimen: Blood Updated: 02/28/24 1349     HS Troponin T <6 ng/L     Narrative:      High Sensitive Troponin  T Reference Range:  <14.0 ng/L- Negative Female for AMI  <22.0 ng/L- Negative Male for AMI  >=14 - Abnormal Female indicating possible myocardial injury.  >=22 - Abnormal Male indicating possible myocardial injury.   Clinicians would have to utilize clinical acumen, EKG, Troponin, and serial changes to determine if it is an Acute Myocardial Infarction or myocardial injury due to an underlying chronic condition.         CBC & Differential [[id number]]  (Abnormal) Collected: 02/28/24 1313    Specimen: Blood Updated: 02/28/24 1323    Narrative:      The following orders were created for panel order CBC & Differential.  Procedure                               Abnormality         Status                     ---------                               -----------         ------                     CBC Auto Differential[[id number]]        Abnormal            Final result                 Please view results for these tests on the individual orders.    Comprehensive Metabolic Panel [[id number]]  (Abnormal) Collected: 02/28/24 1313    Specimen: Blood Updated: 02/28/24 1349     Glucose 137 mg/dL      BUN 9 mg/dL      Creatinine 0.70 mg/dL      Sodium 138 mmol/L      Potassium 3.9 mmol/L      Chloride 104 mmol/L      CO2 23.9 mmol/L      Calcium 8.6 mg/dL      Total Protein 6.7 g/dL      Albumin 3.6 g/dL      ALT (SGPT) 19 U/L      AST (SGOT) 15 U/L      Alkaline Phosphatase 66 U/L      Total Bilirubin <0.2 mg/dL      Globulin 3.1 gm/dL      A/G Ratio 1.2 g/dL      BUN/Creatinine Ratio 12.9     Anion Gap 10.1 mmol/L      eGFR 113.0 mL/min/1.73     Narrative:      GFR Normal >60  Chronic Kidney Disease <60  Kidney Failure <15      Lipase [[id number]]  (Normal) Collected: 02/28/24 1313    Specimen: Blood Updated: 02/28/24 1349     Lipase 30 U/L     BNP [[id number]]  (Normal) Collected: 02/28/24 1313    Specimen: Blood Updated: 02/28/24 1346     proBNP <36.0 pg/mL     Narrative:      This assay is used as an aid in the diagnosis of  individuals suspected of having heart failure. It can be used as an aid in the diagnosis of acute decompensated heart failure (ADHF) in patients presenting with signs and symptoms of ADHF to the emergency department (ED). In addition, NT-proBNP of <300 pg/mL indicates ADHF is not likely.    Age Range Result Interpretation  NT-proBNP Concentration (pg/mL:      <50             Positive            >450                   Gray                 300-450                    Negative             <300    50-75           Positive            >900                  Gray                300-900                  Negative            <300      >75             Positive            >1800                  Gray                300-1800                  Negative            <300    Magnesium [[id number]]  (Normal) Collected: 02/28/24 1313    Specimen: Blood Updated: 02/28/24 1349     Magnesium 2.0 mg/dL     CBC Auto Differential [[id number]]  (Abnormal) Collected: 02/28/24 1313    Specimen: Blood Updated: 02/28/24 1323     WBC 7.95 10*3/mm3      RBC 4.61 10*6/mm3      Hemoglobin 12.0 g/dL      Hematocrit 37.6 %      MCV 81.6 fL      MCH 26.0 pg      MCHC 31.9 g/dL      RDW 13.9 %      RDW-SD 40.7 fl      MPV 9.7 fL      Platelets 355 10*3/mm3      Neutrophil % 67.6 %      Lymphocyte % 20.5 %      Monocyte % 6.9 %      Eosinophil % 4.0 %      Basophil % 0.6 %      Immature Grans % 0.4 %      Neutrophils, Absolute 5.37 10*3/mm3      Lymphocytes, Absolute 1.63 10*3/mm3      Monocytes, Absolute 0.55 10*3/mm3      Eosinophils, Absolute 0.32 10*3/mm3      Basophils, Absolute 0.05 10*3/mm3      Immature Grans, Absolute 0.03 10*3/mm3      nRBC 0.0 /100 WBC     hCG, Quantitative, Pregnancy [[id number]] Collected: 02/28/24 1313    Specimen: Blood Updated: 02/28/24 1453     HCG Quantitative <0.50 mIU/mL     Narrative:      HCG Ranges by Gestational Age    Females - non-pregnant premenopausal   </= 1mIU/mL HCG  Females - postmenopausal               </=  7mIU/mL HCG    3 Weeks       5.4   -      72 mIU/mL  4 Weeks      10.2   -     708 mIU/mL  5 Weeks       217   -   8,245 mIU/mL  6 Weeks       152   -  32,177 mIU/mL  7 Weeks     4,059   - 153,767 mIU/mL  8 Weeks    31,366   - 149,094 mIU/mL  9 Weeks    59,109   - 135,901 mIU/mL  10 Weeks   44,186   - 170,409 mIU/mL  12 Weeks   27,107   - 201,615 mIU/mL  14 Weeks   24,302   -  93,646 mIU/mL  15 Weeks   12,540   -  69,747 mIU/mL  16 Weeks    8,904   -  55,332 mIU/mL  17 Weeks    8,240   -  51,793 mIU/mL  18 Weeks    9,649   -  55,271 mIU/mL      Urinalysis With Microscopic If Indicated (No Culture) - Urine, Clean Catch [[id number]]  (Abnormal) Collected: 02/28/24 1443    Specimen: Urine, Clean Catch Updated: 02/28/24 1459     Color, UA Yellow     Appearance, UA Clear     pH, UA 6.0     Specific Gravity, UA 1.015     Glucose, UA Negative     Ketones, UA Negative     Bilirubin, UA Negative     Blood, UA Small (1+)     Protein, UA Negative     Leuk Esterase, UA Negative     Nitrite, UA Negative     Urobilinogen, UA 0.2 E.U./dL    Urinalysis, Microscopic Only - Urine, Clean Catch [[id number]]  (Abnormal) Collected: 02/28/24 1443    Specimen: Urine, Clean Catch Updated: 02/28/24 1513     RBC, UA 0-2 /HPF      WBC, UA None Seen /HPF      Bacteria, UA None Seen /HPF      Squamous Epithelial Cells, UA 3-6 /HPF      Yeast, UA Small/1+ Yeast /HPF      Hyaline Casts, UA None Seen /LPF      Methodology Automated Microscopy             Imaging:    CT Head Without Contrast    Result Date: 2/28/2024  PROCEDURE: CT HEAD WO CONTRAST  COMPARISON:  [facility], MR, MRI BRAIN WO CONTRAST, 9/09/2023, 14:11.  [facility], CT, CT HEAD WO CONTRAST STROKE PROTOCOL, 12/23/2023, 1:13.  [facility], CT, HEAD W/O CSPINE W/O CONTRAST, 7/29/2018, 20:08. INDICATIONS: hypertension, headache, h/o cva  PROTOCOL:   Standard imaging protocol performed    RADIATION:   DLP: 1017mGy*cm   MA and/or KV was adjusted to  minimize radiation dose.     TECHNIQUE: After obtaining the patient's consent, CT images were obtained without non-ionic intravenous contrast material.  FINDINGS:  The ventricles are not dilated.  There is no underlying hemorrhage.  There is no midline shift.  There are no abnormal extra-axial fluid collections.  The paranasal sinuses and mastoids seem relatively clear.  There is a single opacified left ethmoid air cell.  This has been suggested.        1. An acute intracranial abnormality is not appreciated. 2. Single opacified left ethmoid air cell which appears chronic.     KYLE BUSTAMANTE MD       Electronically Signed and Approved By: KYLE BUSTAMANTE MD on 2/28/2024 at 15:14             XR Chest 1 View    Result Date: 2/28/2024  PROCEDURE: XR CHEST 1 VW  COMPARISON: [facility], , XR CHEST 1 VW, 12/23/2023, 1:41.  INDICATIONS: Chest Pain Triage Protocol  FINDINGS:  The heart is not definitely enlarged.  The lungs seem clear.  There are no pleural effusions.        1. An acute pulmonary process is not apparent.       KYLE BUSTAMANTE MD       Electronically Signed and Approved By: KYLE BUSTAMANTE MD on 2/28/2024 at 13:31                Differential Diagnosis and Discussion:      Chest Pain:  Based on the patient's signs and symptoms, I considered aortic dissection, myocardial infaction, pulmonary embolism, cardiac tamponade, pericarditis, pneumothorax, musculoskeletal chest pain and other differential diagnosis as an etiology of the patient's chest pain.   Headache: Differential diagnosis includes but is not limited to migraine, cluster headache, hypertension, tumor, subarachnoid bleeding, pseudotumor cerebri, temporal arteritis, infections, tension headache, and TMJ syndrome.    All labs were reviewed and interpreted by me.  All X-rays impressions were independently interpreted by me.  EKG was interpreted by me.  EKG was interpreted by supervising attending.  CT scan radiology impression was interpreted by  me.    MDM     Amount and/or Complexity of Data Reviewed  Clinical lab tests: reviewed  Tests in the radiology section of CPT®: reviewed  Tests in the medicine section of CPT®: reviewed                 Patient Care Considerations:    SEPSIS was considered but is NOT present in the emergency department as SIRS criteria is not present. ANTIBIOTICS: I considered prescribing antibiotics as an outpatient however no bacterial focus of infection was found.      Consultants/Shared Management Plan:    None    Social Determinants of Health:    Patient is independent, reliable, and has access to care.       Disposition and Care Coordination:    Discharged: The patient is suitable and stable for discharge with no need for consideration of admission.    I have explained the patient´s condition, diagnoses and treatment plan based on the information available to me at this time. I have answered questions and addressed any concerns. The patient has a good  understanding of the patient´s diagnosis, condition, and treatment plan as can be expected at this point. The vital signs have been stable. The patient´s condition is stable and appropriate for discharge from the emergency department.      The patient will pursue further outpatient evaluation with the primary care physician or other designated or consulting physician as outlined in the discharge instructions. They are agreeable to this plan of care and follow-up instructions have been explained in detail. The patient has received these instructions in written format and has expressed an understanding of the discharge instructions. The patient is aware that any significant change in condition or worsening of symptoms should prompt an immediate return to this or the closest emergency department or call to 911.  I have explained discharge medications and the need for follow up with the patient/caretakers. This was also printed in the discharge instructions. Patient was discharged  with the following medications and follow up:      Medication List      No changes were made to your prescriptions during this visit.      Grecia Do, APRN  [address] [id number] [phone]             Final diagnoses:   Other migraine without status migrainosus, not intractable   Chest pain, unspecified type        ED Disposition       ED Disposition   Discharge    Condition   Stable    Comment   --               This medical record created using voice recognition software.             Jose Strauss PA-C  02/28/24 [id number]

## 2024-02-28 NOTE — Clinical Note
[facility] EMERGENCY ROOM  [address] [place] [address]  Phone: [phone]  Fax: [phone]    Corinne Small was seen and treated in our emergency department on 2/28/2024.  She may return to work on 02/29/2024.         Thank you for choosing [facility].    Paul Teran MD

## 2024-03-11 LAB
QT INTERVAL: 394 MS
QTC INTERVAL: 444 MS

## 2024-06-04 ENCOUNTER — APPOINTMENT (OUTPATIENT)
Dept: CT IMAGING | Facility: HOSPITAL | Age: 39
End: 2024-06-04
Payer: COMMERCIAL

## 2024-06-04 ENCOUNTER — APPOINTMENT (OUTPATIENT)
Dept: GENERAL RADIOLOGY | Facility: HOSPITAL | Age: 39
End: 2024-06-04
Payer: COMMERCIAL

## 2024-06-04 ENCOUNTER — HOSPITAL ENCOUNTER (EMERGENCY)
Facility: HOSPITAL | Age: 39
Discharge: LEFT BEFORE DISCHARGE | End: 2024-06-04
Attending: EMERGENCY MEDICINE | Admitting: EMERGENCY MEDICINE
Payer: COMMERCIAL

## 2024-06-04 VITALS
HEART RATE: 89 BPM | TEMPERATURE: 98.6 F | BODY MASS INDEX: 45.28 KG/M2 | OXYGEN SATURATION: 100 % | SYSTOLIC BLOOD PRESSURE: 139 MMHG | HEIGHT: 64 IN | DIASTOLIC BLOOD PRESSURE: 96 MMHG | RESPIRATION RATE: 18 BRPM | WEIGHT: 265.21 LBS

## 2024-06-04 DIAGNOSIS — R20.2 PARESTHESIA: Primary | ICD-10-CM

## 2024-06-04 DIAGNOSIS — H53.9 VISUAL DISTURBANCE: ICD-10-CM

## 2024-06-04 LAB
ABO GROUP BLD: NORMAL
ALBUMIN SERPL-MCNC: 4.2 G/DL (ref 3.5–5.2)
ALBUMIN/GLOB SERPL: 1.4 G/DL
ALP SERPL-CCNC: 76 U/L (ref 39–117)
ALT SERPL W P-5'-P-CCNC: 22 U/L (ref 1–33)
ANION GAP SERPL CALCULATED.3IONS-SCNC: 11.3 MMOL/L (ref 5–15)
APTT PPP: 35.6 SECONDS (ref 24.2–34.2)
AST SERPL-CCNC: 16 U/L (ref 1–32)
BASOPHILS # BLD AUTO: 0.06 10*3/MM3 (ref 0–0.2)
BASOPHILS NFR BLD AUTO: 0.6 % (ref 0–1.5)
BILIRUB SERPL-MCNC: <0.2 MG/DL (ref 0–1.2)
BILIRUB UR QL STRIP: NEGATIVE
BLD GP AB SCN SERPL QL: NEGATIVE
BUN SERPL-MCNC: 15 MG/DL (ref 6–20)
BUN/CREAT SERPL: 19.2 (ref 7–25)
CALCIUM SPEC-SCNC: 8.9 MG/DL (ref 8.6–10.5)
CHLORIDE SERPL-SCNC: 108 MMOL/L (ref 98–107)
CLARITY UR: CLEAR
CO2 SERPL-SCNC: 20.7 MMOL/L (ref 22–29)
COLOR UR: YELLOW
CREAT SERPL-MCNC: 0.78 MG/DL (ref 0.57–1)
DEPRECATED RDW RBC AUTO: 39.9 FL (ref 37–54)
EGFRCR SERPLBLD CKD-EPI 2021: 98.6 ML/MIN/1.73
EOSINOPHIL # BLD AUTO: 0.17 10*3/MM3 (ref 0–0.4)
EOSINOPHIL NFR BLD AUTO: 1.8 % (ref 0.3–6.2)
ERYTHROCYTE [DISTWIDTH] IN BLOOD BY AUTOMATED COUNT: 14.1 % (ref 12.3–15.4)
GLOBULIN UR ELPH-MCNC: 3.1 GM/DL
GLUCOSE BLDC GLUCOMTR-MCNC: 107 MG/DL (ref 70–99)
GLUCOSE SERPL-MCNC: 105 MG/DL (ref 65–99)
GLUCOSE UR STRIP-MCNC: NEGATIVE MG/DL
HCT VFR BLD AUTO: 37.6 % (ref 34–46.6)
HGB BLD-MCNC: 12.4 G/DL (ref 12–15.9)
HGB UR QL STRIP.AUTO: NEGATIVE
HOLD SPECIMEN: NORMAL
HOLD SPECIMEN: NORMAL
IMM GRANULOCYTES # BLD AUTO: 0.05 10*3/MM3 (ref 0–0.05)
IMM GRANULOCYTES NFR BLD AUTO: 0.5 % (ref 0–0.5)
INR PPP: 0.97 (ref 0.86–1.15)
KETONES UR QL STRIP: NEGATIVE
LEUKOCYTE ESTERASE UR QL STRIP.AUTO: NEGATIVE
LYMPHOCYTES # BLD AUTO: 1.96 10*3/MM3 (ref 0.7–3.1)
LYMPHOCYTES NFR BLD AUTO: 20.2 % (ref 19.6–45.3)
MCH RBC QN AUTO: 25.9 PG (ref 26.6–33)
MCHC RBC AUTO-ENTMCNC: 33 G/DL (ref 31.5–35.7)
MCV RBC AUTO: 78.7 FL (ref 79–97)
MONOCYTES # BLD AUTO: 0.52 10*3/MM3 (ref 0.1–0.9)
MONOCYTES NFR BLD AUTO: 5.4 % (ref 5–12)
NEUTROPHILS NFR BLD AUTO: 6.94 10*3/MM3 (ref 1.7–7)
NEUTROPHILS NFR BLD AUTO: 71.5 % (ref 42.7–76)
NITRITE UR QL STRIP: NEGATIVE
NRBC BLD AUTO-RTO: 0 /100 WBC (ref 0–0.2)
PH UR STRIP.AUTO: 7 [PH] (ref 5–8)
PLATELET # BLD AUTO: 385 10*3/MM3 (ref 140–450)
PMV BLD AUTO: 9.7 FL (ref 6–12)
POTASSIUM SERPL-SCNC: 4 MMOL/L (ref 3.5–5.2)
PROT SERPL-MCNC: 7.3 G/DL (ref 6–8.5)
PROT UR QL STRIP: NEGATIVE
PROTHROMBIN TIME: 13.1 SECONDS (ref 11.8–14.9)
QT INTERVAL: 411 MS
QTC INTERVAL: 441 MS
RBC # BLD AUTO: 4.78 10*6/MM3 (ref 3.77–5.28)
RH BLD: POSITIVE
SODIUM SERPL-SCNC: 140 MMOL/L (ref 136–145)
SP GR UR STRIP: >1.03 (ref 1–1.03)
T&S EXPIRATION DATE: NORMAL
TROPONIN T SERPL HS-MCNC: <6 NG/L
UROBILINOGEN UR QL STRIP: ABNORMAL
WBC NRBC COR # BLD AUTO: 9.7 10*3/MM3 (ref 3.4–10.8)
WHOLE BLOOD HOLD COAG: NORMAL
WHOLE BLOOD HOLD SPECIMEN: NORMAL

## 2024-06-04 PROCEDURE — 86900 BLOOD TYPING SEROLOGIC ABO: CPT | Performed by: EMERGENCY MEDICINE

## 2024-06-04 PROCEDURE — 82948 REAGENT STRIP/BLOOD GLUCOSE: CPT

## 2024-06-04 PROCEDURE — 93005 ELECTROCARDIOGRAM TRACING: CPT | Performed by: EMERGENCY MEDICINE

## 2024-06-04 PROCEDURE — 84484 ASSAY OF TROPONIN QUANT: CPT | Performed by: EMERGENCY MEDICINE

## 2024-06-04 PROCEDURE — 70496 CT ANGIOGRAPHY HEAD: CPT

## 2024-06-04 PROCEDURE — 0042T HC CT CEREBRAL PERFUSION W/WO CONTRAST: CPT

## 2024-06-04 PROCEDURE — 71045 X-RAY EXAM CHEST 1 VIEW: CPT

## 2024-06-04 PROCEDURE — 85025 COMPLETE CBC W/AUTO DIFF WBC: CPT | Performed by: EMERGENCY MEDICINE

## 2024-06-04 PROCEDURE — 70450 CT HEAD/BRAIN W/O DYE: CPT

## 2024-06-04 PROCEDURE — 25510000001 IOPAMIDOL PER 1 ML: Performed by: EMERGENCY MEDICINE

## 2024-06-04 PROCEDURE — 80053 COMPREHEN METABOLIC PANEL: CPT | Performed by: EMERGENCY MEDICINE

## 2024-06-04 PROCEDURE — 86901 BLOOD TYPING SEROLOGIC RH(D): CPT | Performed by: EMERGENCY MEDICINE

## 2024-06-04 PROCEDURE — 85730 THROMBOPLASTIN TIME PARTIAL: CPT | Performed by: EMERGENCY MEDICINE

## 2024-06-04 PROCEDURE — 85610 PROTHROMBIN TIME: CPT | Performed by: EMERGENCY MEDICINE

## 2024-06-04 PROCEDURE — 70498 CT ANGIOGRAPHY NECK: CPT

## 2024-06-04 PROCEDURE — 81003 URINALYSIS AUTO W/O SCOPE: CPT | Performed by: EMERGENCY MEDICINE

## 2024-06-04 PROCEDURE — 86850 RBC ANTIBODY SCREEN: CPT | Performed by: EMERGENCY MEDICINE

## 2024-06-04 PROCEDURE — 99285 EMERGENCY DEPT VISIT HI MDM: CPT

## 2024-06-04 PROCEDURE — 99214 OFFICE O/P EST MOD 30 MIN: CPT | Performed by: PSYCHIATRY & NEUROLOGY

## 2024-06-04 RX ORDER — SODIUM CHLORIDE 0.9 % (FLUSH) 0.9 %
10 SYRINGE (ML) INJECTION ONCE
Status: DISCONTINUED | OUTPATIENT
Start: 2024-06-04 | End: 2024-06-04

## 2024-06-04 RX ORDER — SODIUM CHLORIDE 0.9 % (FLUSH) 0.9 %
10 SYRINGE (ML) INJECTION
Status: ACTIVE | OUTPATIENT
Start: 2024-06-04 | End: 2024-06-04

## 2024-06-04 RX ORDER — SODIUM CHLORIDE 0.9 % (FLUSH) 0.9 %
10 SYRINGE (ML) INJECTION AS NEEDED
Status: DISCONTINUED | OUTPATIENT
Start: 2024-06-04 | End: 2024-06-04 | Stop reason: HOSPADM

## 2024-06-04 RX ADMIN — IOPAMIDOL 100 ML: 755 INJECTION, SOLUTION INTRAVENOUS at 13:46

## 2024-06-04 RX ADMIN — IOPAMIDOL 80 ML: 755 INJECTION, SOLUTION INTRAVENOUS at 13:28

## 2024-06-04 NOTE — DISCHARGE INSTRUCTIONS
Continue current medications.  Do not smoke.  Avoid caffeinated beverages.  Return for worsening symptoms.  Follow-up with your neurologist tomorrow to have the remainder of your test performed.

## 2024-06-04 NOTE — ED PROVIDER NOTES
Time: 2:43 PM EDT  Date of encounter:  2024  Independent Historian/Clinical History and Information was obtained by:   Patient    History is limited by: N/A    Chief Complaint: Visual disturbance and numbness and coordination      History of Present Illness:  Patient is a 39 y.o. year old female who presents to the emergency department for evaluation of visual disturbance, numbness her right face and poor coordination.  The symptoms began shortly before presentation to the ED    HPI    Patient Care Team  Primary Care Provider: Grecia Do APRN    Past Medical History:     Allergies   Allergen Reactions    Clindamycin/Lincomycin GI Intolerance    Solu-Medrol [Methylprednisolone Sodium Succ] Hives    Strawberry Extract Unknown - High Severity     Pt uncooperative with question, but noted in previous chart 2013    Lisinopril Swelling     Past Medical History:   Diagnosis Date    Anxiety     MS (multiple sclerosis)     RA (rheumatoid arthritis)     Stroke      Past Surgical History:   Procedure Laterality Date     SECTION       Family History   Problem Relation Age of Onset    Heart disease Mother     Heart disease Other        Home Medications:  Prior to Admission medications    Medication Sig Start Date End Date Taking? Authorizing Provider   aspirin 81 MG chewable tablet Chew 1 tablet Daily.    Yvonne Nuno MD   buPROPion XL (WELLBUTRIN XL) 150 MG 24 hr tablet Take 1 tablet by mouth Daily.    Yvonne Nuno MD   cloNIDine (CATAPRES) 0.1 MG tablet Take 1 tablet by mouth 2 (Two) Times a Day.    Yvonne Nuno MD   cloNIDine (CATAPRES) 0.2 MG tablet Take 1 tablet by mouth 2 (Two) Times a Day. 23   Maykel Arambula DO   FLUoxetine (PROzac) 20 MG capsule Take 1 capsule by mouth Daily.    Yvonne Nuno MD   ibuprofen (ADVIL,MOTRIN) 200 MG tablet Take 1 tablet by mouth Every 6 (Six) Hours As Needed for Mild Pain.    Yvonne Nuno MD   ketorolac (TORADOL) 10 MG  "tablet Take 1 tablet by mouth Every 6 (Six) Hours As Needed for Moderate Pain or Severe Pain. 9/9/23   Benedicto Alva MD   ondansetron ODT (ZOFRAN-ODT) 4 MG disintegrating tablet Place 1 tablet on the tongue Every 6 (Six) Hours As Needed for Nausea or Vomiting. 9/21/23   Maykel Arambula DO   risperiDONE (risperDAL) 1 MG tablet Take 1 tablet by mouth 2 (Two) Times a Day.    Provider, Yvonne, MD        Social History:   Social History     Tobacco Use    Smoking status: Every Day     Current packs/day: 0.50     Types: Cigarettes    Smokeless tobacco: Never   Vaping Use    Vaping status: Never Used   Substance Use Topics    Alcohol use: Never    Drug use: Never     Comment: Positive for amph/meth 2/10/23         Review of Systems:  Review of Systems   Constitutional:  Negative for chills and fever.   HENT:  Negative for congestion, ear pain and sore throat.    Eyes:  Positive for visual disturbance. Negative for pain.   Respiratory:  Negative for cough, chest tightness and shortness of breath.    Cardiovascular:  Negative for chest pain.   Gastrointestinal:  Negative for abdominal pain, diarrhea, nausea and vomiting.   Genitourinary:  Negative for flank pain and hematuria.   Musculoskeletal:  Negative for joint swelling.   Skin:  Negative for pallor.   Neurological:  Positive for weakness and numbness. Negative for seizures and headaches.   All other systems reviewed and are negative.       Physical Exam:  /96   Pulse 89   Temp 98.6 °F (37 °C) (Oral)   Resp 18   Ht 162.6 cm (64\")   Wt 120 kg (265 lb 3.4 oz)   SpO2 100%   BMI 45.52 kg/m²     Physical Exam  Vitals and nursing note reviewed.   Constitutional:       General: She is not in acute distress.     Appearance: Normal appearance. She is not toxic-appearing.   HENT:      Head: Normocephalic and atraumatic.      Mouth/Throat:      Mouth: Mucous membranes are moist.   Eyes:      General: No scleral icterus.  Cardiovascular:      Rate and Rhythm: Normal " rate and regular rhythm.      Pulses: Normal pulses.      Heart sounds: Normal heart sounds.   Pulmonary:      Effort: Pulmonary effort is normal. No respiratory distress.      Breath sounds: Normal breath sounds.   Abdominal:      General: Abdomen is flat.      Palpations: Abdomen is soft.      Tenderness: There is no abdominal tenderness.   Musculoskeletal:         General: Normal range of motion.      Cervical back: Normal range of motion and neck supple.   Skin:     General: Skin is warm and dry.      Capillary Refill: Capillary refill takes less than 2 seconds.   Neurological:      General: No focal deficit present.      Mental Status: She is alert and oriented to person, place, and time. Mental status is at baseline.                  Procedures:  Procedures      Medical Decision Making:      Comorbidities that affect care:    MS    External Notes reviewed:    Previous Clinic Note: offfcie visit for obesity      The following orders were placed and all results were independently analyzed by me:  Orders Placed This Encounter   Procedures    CT Head Without Contrast Stroke Protocol    CT Angiogram Head w AI Analysis of LVO    CT Angiogram Neck    CT CEREBRAL PERFUSION WITH & WITHOUT CONTRAST    XR Chest 1 View    [place] Draw    Comprehensive Metabolic Panel    Protime-INR    aPTT    Single High Sensitivity Troponin T    Urinalysis With Microscopic If Indicated (No Culture) - Urine, Clean Catch    CBC Auto Differential    Measure Actual Weight    Undress and Gown    Continuous Pulse Oximetry    Vital Signs    Notify Provider for SBP <80 or >200    Notify Provider for SBP >140 (For Hemorrhagic Stroke)    Nursing Dysphagia Screening (Complete Prior to Giving anything PO)    Notify Provider and Activate Thrombolytic Induced Angioedema Order Set (see comments)    POC Glucose Once    POC Glucose Once    ECG 12 Lead ED Triage Standing Order; Acute Stroke (Onset <12 hrs)    ECG 12 Lead Other; Repeat due to EKG error     Type & Screen    CBC & Differential    Green Top (Gel)    Lavender Top    Gold Top - SST    Light Blue Top       Medications Given in the Emergency Department:  Medications   sodium chloride 0.9 % flush 10 mL (10 mL Intravenous Not Given 6/4/24 1419)   iopamidol (ISOVUE-370) 76 % injection 100 mL (80 mL Intravenous Given 6/4/24 1328)   iopamidol (ISOVUE-370) 76 % injection 100 mL (100 mL Intravenous Given 6/4/24 1346)        ED Course:           EKG: Normal sinus rhythm with a rate of 76 BPM   First degree AV block  No acute ischemia  Labs:    Results for orders placed or performed during the hospital encounter of 06/04/24   Comprehensive Metabolic Panel    Specimen: Blood   Result Value Ref Range    Glucose 105 (H) 65 - 99 mg/dL    BUN 15 6 - 20 mg/dL    Creatinine 0.78 0.57 - 1.00 mg/dL    Sodium 140 136 - 145 mmol/L    Potassium 4.0 3.5 - 5.2 mmol/L    Chloride 108 (H) 98 - 107 mmol/L    CO2 20.7 (L) 22.0 - 29.0 mmol/L    Calcium 8.9 8.6 - 10.5 mg/dL    Total Protein 7.3 6.0 - 8.5 g/dL    Albumin 4.2 3.5 - 5.2 g/dL    ALT (SGPT) 22 1 - 33 U/L    AST (SGOT) 16 1 - 32 U/L    Alkaline Phosphatase 76 39 - 117 U/L    Total Bilirubin <0.2 0.0 - 1.2 mg/dL    Globulin 3.1 gm/dL    A/G Ratio 1.4 g/dL    BUN/Creatinine Ratio 19.2 7.0 - 25.0    Anion Gap 11.3 5.0 - 15.0 mmol/L    eGFR 98.6 >60.0 mL/min/1.73   Protime-INR    Specimen: Blood   Result Value Ref Range    Protime 13.1 11.8 - 14.9 Seconds    INR 0.97 0.86 - 1.15   aPTT    Specimen: Blood   Result Value Ref Range    PTT 35.6 (H) 24.2 - 34.2 seconds   Single High Sensitivity Troponin T    Specimen: Blood   Result Value Ref Range    HS Troponin T <6 <14 ng/L   Urinalysis With Microscopic If Indicated (No Culture) - Urine, Clean Catch    Specimen: Urine, Clean Catch   Result Value Ref Range    Color, UA Yellow Yellow, Straw    Appearance, UA Clear Clear    pH, UA 7.0 5.0 - 8.0    Specific Gravity, UA >1.030 (H) 1.005 - 1.030    Glucose, UA Negative Negative     Ketones, UA Negative Negative    Bilirubin, UA Negative Negative    Blood, UA Negative Negative    Protein, UA Negative Negative    Leuk Esterase, UA Negative Negative    Nitrite, UA Negative Negative    Urobilinogen, UA 0.2 E.U./dL 0.2 - 1.0 E.U./dL   CBC Auto Differential    Specimen: Blood   Result Value Ref Range    WBC 9.70 3.40 - 10.80 10*3/mm3    RBC 4.78 3.77 - 5.28 10*6/mm3    Hemoglobin 12.4 12.0 - 15.9 g/dL    Hematocrit 37.6 34.0 - 46.6 %    MCV 78.7 (L) 79.0 - 97.0 fL    MCH 25.9 (L) 26.6 - 33.0 pg    MCHC 33.0 31.5 - 35.7 g/dL    RDW 14.1 12.3 - 15.4 %    RDW-SD 39.9 37.0 - 54.0 fl    MPV 9.7 6.0 - 12.0 fL    Platelets 385 140 - 450 10*3/mm3    Neutrophil % 71.5 42.7 - 76.0 %    Lymphocyte % 20.2 19.6 - 45.3 %    Monocyte % 5.4 5.0 - 12.0 %    Eosinophil % 1.8 0.3 - 6.2 %    Basophil % 0.6 0.0 - 1.5 %    Immature Grans % 0.5 0.0 - 0.5 %    Neutrophils, Absolute 6.94 1.70 - 7.00 10*3/mm3    Lymphocytes, Absolute 1.96 0.70 - 3.10 10*3/mm3    Monocytes, Absolute 0.52 0.10 - 0.90 10*3/mm3    Eosinophils, Absolute 0.17 0.00 - 0.40 10*3/mm3    Basophils, Absolute 0.06 0.00 - 0.20 10*3/mm3    Immature Grans, Absolute 0.05 0.00 - 0.05 10*3/mm3    nRBC 0.0 0.0 - 0.2 /100 WBC   POC Glucose Once    Specimen: Blood   Result Value Ref Range    Glucose 107 (H) 70 - 99 mg/dL   ECG 12 Lead ED Triage Standing Order; Acute Stroke (Onset <12 hrs)   Result Value Ref Range    QT Interval 411 ms    QTC Interval 441 ms   ECG 12 Lead Other; Repeat due to EKG error   Result Value Ref Range    QT Interval 402 ms    QTC Interval 452 ms   Type & Screen    Specimen: Blood   Result Value Ref Range    ABO Type AB     RH type Positive     Antibody Screen Negative     T&S Expiration Date 6/11/2024 11:59:00 PM    Green Top (Gel)   Result Value Ref Range    Extra Tube Hold for add-ons.    Lavender Top   Result Value Ref Range    Extra Tube hold for add-on    Gold Top - SST   Result Value Ref Range    Extra Tube Hold for add-ons.     Light Blue Top   Result Value Ref Range    Extra Tube Hold for add-ons.          Lab Results (last 24 hours)       Procedure Component Value Units Date/Time    Urinalysis With Microscopic If Indicated (No Culture) - Urine, Clean Catch [[id number]]  (Abnormal) Collected: 06/04/24 1407    Specimen: Urine, Clean Catch Updated: 06/04/24 1426     Color, UA Yellow     Appearance, UA Clear     pH, UA 7.0     Specific Gravity, UA >1.030     Glucose, UA Negative     Ketones, UA Negative     Bilirubin, UA Negative     Blood, UA Negative     Protein, UA Negative     Leuk Esterase, UA Negative     Nitrite, UA Negative     Urobilinogen, UA 0.2 E.U./dL    Narrative:      Urine microscopic not indicated.             Imaging:    XR Chest 1 View    Result Date: 6/4/2024  XR CHEST 1 VW Date of Exam: 6/4/2024 2:44 PM EDT Indication: Acute Stroke Protocol (Onset < 12 hrs) Comparison: Chest radiograph 2/28/2024 Findings: Lungs are clear without focal consolidation, overt edema, large effusion or pneumothorax. Heart size within normal limits. Osseous structures are grossly unremarkable.      No acute cardiopulmonary abnormality. Electronically Signed: Glenroy Haji MD  6/4/2024 2:51 PM EDT  Workstation ID: [id number]       Differential Diagnosis and Discussion:    Paresthesia: Differential diagnosis includes but is not limited to acute stroke, electrolyte imbalance, anxiety, radiculopathy, autoimmune disorders, and endocrine disorders.    All labs were reviewed and interpreted by me.  EKG was interpreted by me.    MDM  Number of Diagnoses or Management Options  Paresthesia  Visual disturbance  Diagnosis management comments: The patient refuses admission and I explained that she could die or be permanently disabled.  She is alert and oriented x3 and competent to make medical decisions.  She refuses admission, accepts responsibility for her decision and she will leave AMA       Amount and/or Complexity of Data Reviewed  Clinical lab  tests: reviewed  Tests in the radiology section of CPT®: reviewed  Tests in the medicine section of CPT®: reviewed                 Patient Care Considerations:    MRI: I considered ordering an MRI however this can be performed as an inpatient      Consultants/Shared Management Plan:    Consultant: I have discussed the case with Teleneurologist who states the patient initially was going to receive thrombolytics and then she refused.  The patient will need admission and further evaluation    Social Determinants of Health:    Patient has presented with family members who are responsible, reliable and will ensure follow up care.      Disposition and Care Coordination:    AMA        Final diagnoses:   Paresthesia   Visual disturbance        ED Disposition       ED Disposition   AMA    Condition   --    Comment   --               This medical record created using voice recognition software.             Asa Jordan, DO  06/05/24 1400

## 2024-06-04 NOTE — CONSULTS
Addendum: CTA reviewed no LVO.         [facility]  [facility]      Patient Name:   Corinne Small  YOB: 1985  Identification Number:   MRN - [id number]  Date of Service:   06/04/2024 12:47:55    Diagnosis:        H53.8 - Blurred Vision        R20.2 - Paresthesia of skin    Impression:       This is a 39-year-old female with history of obesity, depression, prior history of meth use, suicidal ideas, Migraine, reported history of stroke years ago where she was hospitalized somewhere else and was in a coma situation. She had complete recovery. Reported history of multiple sclerosis that is not active per her report. She takes aspirin 325 mg daily.      She also has a chronic history of right eye intermittent blurry vision due to some optic nerve issue.   Last known well was around 11 PM when she felt dizzy. She fell she could not get her thoughts together. she lost her vision with her right eye almost completely with pain in that eye. Her right leg is weak and she has numbness on the right side of her face ,arm and left.      When I saw her she was awake alert oriented x 4 she has no aphasia. She has decreased sensation over the right side of her face arm and leg. She is not able to see with her right eye almost nothing. She has right lower extremity drift +1. She was having dizziness with spinning sensation and difficulty walking.         She reports ho multiple sclerosis but she is not on any disease modifying agent I only see one MRI of the brain without contrast in the system back in September 2023 and there was only mild white matter signal         I told the patient that her constellation of symptoms are kind of unusual for stroke localization and also stroke usually does not cause pain in the eye. However stroke is not completely ruled out. But there are other possibilities such as complicated migraine, multiple sclerosis flareup and functional etiology.  We had a long discussion about the availability of the TNK and she was within the window she initially agreed to it but then eventually changed her mind once she realized that she has to stay in the ICU for post tenecteplase care. She was aware of the potential benefits of 30% improvement within 3 months. 6% chance of bleed and 3% chance of significant bleed.   She is currently getting a CT angiogram head and neck to rule out LVO if there is no LVO she will be admitted for routine workup.   She takes aspirin 325 mg at home. We can load her with Plavix 300 mg x 1 right now.   Tomorrow we can do baby aspirin and Plavix 75 mg daily.   She will need a brain MRI with and without contrast and an MRI orbit with and without contrast.   If she has a stroke then complete stroke workup with echocardiogram with bubble study, fasting lipid profile and hemoglobin A1c.   Telemetry monitoring.   Permissive hypertension as below.   PT, OT, speech.   DVT prophylaxis per primary team choice.      Check alcohol level and UDS.   Thank you for the consult    Our recommendations are outlined below.    Recommendations:          Stroke/Telemetry Floor        Neuro Checks        Bedside Swallow Eval        DVT Prophylaxis        IV Fluids, Normal Saline        Head of Bed 30 Degrees        Euglycemia and Avoid Hyperthermia (PRN Acetaminophen)        Antihypertensives PRN if Blood pressure is greater than 220/120 or there is a concern for End organ damage/contraindications for permissive HTN. If blood pressure is greater than 220/120 give labetalol PO or IV or Vasotec IV with a goal of 15% reduction in BP during the first 24 hours.    DVT prophylaxis:        Choice of Primary Team        Lovenox or LMW Heparin    Disposition:        Neurology Follow Up Recommended    Sign Out:        Discussed with Emergency Department Provider        Discussed with Rapid Response  Team        ------------------------------------------------------------------------------    Metrics:  Last Known Well: 06/04/2024 11:10:00  [facility] Notification Time: 06/04/2024 12:47:05  Arrival Time: 06/04/2024 12:40:00  Stamp Time: 06/04/2024 12:47:55  Initial Response Time: 06/04/2024 12:58:44  Symptoms: right eye vision loss, dizziness and right leg weakness. .  Initial patient interaction: 06/04/2024 12:58:44  NIHSS Assessment Completed: 06/04/2024 13:01:00  Patient is not a candidate for Thrombolytic.  Thrombolytic Medical Decision: 06/04/2024 13:10:00  Patient was not deemed candidate for Thrombolytic because of following reasons:  Patient and/or Family refused.  Weight Noted by Staff: 120 kg    CT head showed no acute hemorrhage or acute core infarct.  I personally Reviewed the CT Head and it Showed no acute findings.    Primary Provider Notified of Diagnostic Impression and Management Plan on: 06/04/2024 13:26:00        ------------------------------------------------------------------------------    History of Present Illness:  Patient is a 39 year old Female.    Patient was brought by private transportation with symptoms of right eye vision loss, dizziness and right leg weakness. .  This is a 39-year-old female with history of obesity, depression, prior history of meth use, suicidal ideas, Migraine, reported history of stroke years ago where she was hospitalized somewhere else and was in a coma situation. She had complete recovery. Reported history of multiple sclerosis that is not active per her report. She takes aspirin 325 mg daily.    She also has a chronic history of right eye intermittent blurry vision due to some optic nerve issue.  Last known well was around 11 PM when she felt dizzy. She fell she could not get her thoughts together. she lost her vision with her right eye almost completely with pain in that eye. Her right leg is weak and she has numbness on the right side of her face  ,arm and left.    When I saw her she was awake alert oriented x 4 she has no aphasia. She has decreased sensation over the right side of her face arm and leg. She is not able to see with her right eye almost nothing. She has right lower extremity drift +1. She was having dizziness with spinning sensation and difficulty walking.      She reports ho multiple sclerosis but she is not on any disease modifying agent I only see one MRI of the brain without contrast in the system back in September 2023 and there was only mild white matter signal        Past Medical History:       Hypertension       Stroke       Migraine Headaches       There is no history of Diabetes Mellitus       There is no history of Atrial Fibrillation       There is no history of Seizures    Medications:    No Anticoagulant use   Antiplatelet use: Yes   Reviewed EMR for current medications    Allergies:   Reviewed  Description: Lisinopril and Solumedrol.    Social History:  Smoking: Yes  Alcohol Use: No  Drug Use: Former    Family History:    There is no family history of premature cerebrovascular disease pertinent to this consultation    ROS :  14 Points Review of Systems was performed and was negative except mentioned in HPI.    Past Surgical History:  There Is No Surgical History Contributory To Today’s Visit        Examination:  BP(101/80), Pulse(85),  1A: Level of Consciousness - Alert; keenly responsive + 0  1B: Ask Month and Age - Both Questions Right + 0  1C: Blink Eyes & Squeeze Hands - Performs Both Tasks + 0  2: Test Horizontal Extraocular Movements - Normal + 0  3: Test Visual Fields - Complete Hemianopia + 2  4: Test Facial Palsy (Use Grimace if Obtunded) - Normal symmetry + 0  5A: Test Left Arm Motor Drift - No Drift for 10 Seconds + 0  5B: Test Right Arm Motor Drift - No Drift for 10 Seconds + 0  6A: Test Left Leg Motor Drift - No Drift for 5 Seconds + 0  6B: Test Right Leg Motor Drift - Drift, but doesn't hit bed + 1  7: Test  Limb Ataxia (FNF/Heel-Shin) - No Ataxia + 0  8: Test Sensation - Mild-Moderate Loss: Less Sharp/More Dull + 1  9: Test Language/Aphasia - Normal; No aphasia + 0  10: Test Dysarthria - Normal + 0  11: Test Extinction/Inattention - No abnormality + 0    Pre-Morbid Modified [place] Scale:  0 Points = No symptoms at all    Spoke with : ER physician    This consult was conducted in real time using interactive audio and video technology. Patient was informed of the technology being used for this visit and agreed to proceed. Patient located in hospital and provider located at home/office setting.      Patient is being evaluated for possible acute neurologic impairment and high probability of imminent or life-threatening deterioration. I spent total of 70 minutes providing care to this patient, including time for face to face visit via telemedicine, review of medical records, imaging studies and discussion of findings with providers, the patient and/or family.      Dr Skylar March      [facility]  For Inpatient follow-up with [facility] physician please call [facility] [phone]. This is not an outpatient service. Post hospital discharge, please contact hospital directly.    Please do not communicate with [facility] physicians via secure chat. If you have any questions, Please contact [facility].  Please call or reconsult our service if there are any clinical or diagnostic changes.

## 2024-06-04 NOTE — Clinical Note
[facility] EMERGENCY ROOM  [address] [place] [address]  Phone: [phone]  Fax: [phone]    Corinne Small was seen and treated in our emergency department on 6/4/2024.  She may return to work on 06/06/2024.         Thank you for choosing [facility].    Asa Jordan, DO

## 2024-06-06 LAB
QT INTERVAL: 402 MS
QTC INTERVAL: 452 MS

## 2024-07-23 NOTE — PROGRESS NOTES
CARDIOLOGY NEW PATIENT VISIT      Chief Complaint  Hypertension, Chest Pain, Dizziness, Shortness of Breath, and Rapid Heart Rate  Recent stroke, palpitation.    Subjective            Corinne Small is a 39 y.o. female who presents to [facility] CARDIOLOGY  History of Present Illness  39-year-old lady with past medical history of multiple sclerosis, stroke, current smoker, hypertension, who presents to cardiology clinic after recent ED visit for strokelike symptoms.  1 month back patient had sudden onset vision loss in the right eye, dizziness for which she went to ED.  CT scan of head and neck were done which were unremarkable.  Patient had teleneurology evaluation and was recommended to have MRI of the head as well as thrombolytic therapy.  Patient denied to thrombolytic therapy as she did not wanted to be in the ICU and left AMA.  Patient states she followed up with her neurologist 3 days after the ED visit and was suggested to follow-up with cardiology and then follow back with them.  Patient takes aspirin 325 mg daily and he states her multiple sclerosis is well-controlled at this time.  Patient also states having palpitation almost daily.  She endorses having chest heaviness and near syncopal-like episodes.  Labs and EKG from recent hospitalization were reviewed.  EKG showed sinus rhythm with heart acute ST-T changes.  KS interval was 203 otherwise no significant findings were noted.  Patient currently denies chest pain, shortness of breath, palpitation, leg edema.  Patient states that she had stroke in 2021.  She does have history of anxiety/depression and takes Cymbalta.  She is on clonidine 0.2 mg twice a day for blood pressure control.    Past History:  Past Medical History:   Diagnosis Date    Abnormal ECG 6/10/24    Anxiety     Arrhythmia 04/06/21    Asthma 6/10/24    Clotting disorder 6/10/24    Hypertension 10/10/2015    MS (multiple sclerosis)     RA (rheumatoid arthritis)      Stroke        Medical History:  Past Medical History:   Diagnosis Date    Abnormal ECG 6/10/24    Anxiety     Arrhythmia 21    Asthma 6/10/24    Clotting disorder 6/10/24    Hypertension 10/10/2015    MS (multiple sclerosis)     RA (rheumatoid arthritis)     Stroke        Surgical History:   has a past surgical history that includes  section.     Family History:   family history includes Anemia in her maternal grandmother; Arrhythmia in her maternal grandfather; Clotting disorder in her maternal grandmother; Fainting in her mother; Heart attack in her maternal grandfather; Heart disease in her maternal grandfather, mother, and another family member; Heart failure in her maternal grandfather; Hypertension in her mother.     Social History:   reports that she has been smoking cigarettes. She started smoking about 3 years ago. She has a 1.6 pack-year smoking history. She has never used smokeless tobacco. She reports that she does not currently use alcohol. She reports that she does not currently use drugs.    Allergies:   Clindamycin/lincomycin, Solu-medrol [methylprednisolone sodium succ], Strawberry extract, and Lisinopril    Current Outpatient Medications on File Prior to Visit   Medication Sig    albuterol sulfate  (90 Base) MCG/ACT inhaler INHALE 2 PUFFS BY MOUTH EVERY 4 TO 6 HOURS AS NEEDED FOR SHORTNESS OF BREATH OR WHEEZING    amitriptyline (ELAVIL) 50 MG tablet Take 1 tablet by mouth every night at bedtime.    aspirin 81 MG chewable tablet Chew 1 tablet Daily.    cloNIDine (CATAPRES) 0.2 MG tablet Take 1 tablet by mouth 2 (Two) Times a Day.    Cymbalta 30 MG capsule Take 1 capsule by mouth Daily.    FLUoxetine (PROzac) 20 MG capsule Take 1 capsule by mouth Daily.    ibuprofen (ADVIL,MOTRIN) 200 MG tablet Take 1 tablet by mouth Every 6 (Six) Hours As Needed for Mild Pain.    topiramate (TOPAMAX) 50 MG tablet Take 1 tablet by mouth Every 12 (Twelve) Hours.    [DISCONTINUED] buPROPion XL  "(WELLBUTRIN XL) 150 MG 24 hr tablet Take 1 tablet by mouth Daily.    [DISCONTINUED] cloNIDine (CATAPRES) 0.1 MG tablet Take 1 tablet by mouth 2 (Two) Times a Day.    [DISCONTINUED] ketorolac (TORADOL) 10 MG tablet Take 1 tablet by mouth Every 6 (Six) Hours As Needed for Moderate Pain or Severe Pain.    [DISCONTINUED] ondansetron ODT (ZOFRAN-ODT) 4 MG disintegrating tablet Place 1 tablet on the tongue Every 6 (Six) Hours As Needed for Nausea or Vomiting.    [DISCONTINUED] risperiDONE (risperDAL) 1 MG tablet Take 1 tablet by mouth 2 (Two) Times a Day.     No current facility-administered medications on file prior to visit.          Review of Systems   Positive for palpitation and near syncopal episodes.  Negative for chest pain, leg edema.    Objective     /61   Pulse 82   Ht 162.6 cm (64.02\")   Wt 118 kg (260 lb 6.4 oz)   BMI 44.68 kg/m²       Physical Exam  General : Alert, awake, no acute distress  Neck : Supple, no carotid bruit, no jugular venous distention  CVS : Regular rate and rhythm, no murmur, rubs or gallops  Lungs: Clear to auscultation bilaterally, no crackles or rhonchi  Abdomen: Soft, nontender, bowel sounds heard in all 4 quadrants  Extremities: Warm, well-perfused, no pedal edema      Result Review :     The following data was reviewed by: Beltran Raya MD on 07/26/2024:    CMP          2/14/2024    11:16 2/28/2024    13:13 6/4/2024    12:59   CMP   Glucose 111  137  105    BUN 9  9  15    Creatinine 0.76  0.70  0.78    EGFR 102.4  113.0  98.6    Sodium 139  138  140    Potassium 4.8  3.9  4.0    Chloride 104  104  108    Calcium 8.9  8.6  8.9    Total Protein 7.1  6.7  7.3    Albumin 4.1  3.6  4.2    Globulin 3.0  3.1  3.1    Total Bilirubin 0.3  <0.2  <0.2    Alkaline Phosphatase 76  66  76    AST (SGOT) 20  15  16    ALT (SGPT) 22  19  22    Albumin/Globulin Ratio 1.4  1.2  1.4    BUN/Creatinine Ratio 11.8  12.9  19.2    Anion Gap 11.3  10.1  11.3      CBC          2/14/2024    11:16 " 2/28/2024    13:13 6/4/2024    12:59   CBC   WBC 8.50  7.95  9.70    RBC 4.69  4.61  4.78    Hemoglobin 12.4  12.0  12.4    Hematocrit 38.2  37.6  37.6    MCV 81.4  81.6  78.7    MCH 26.4  26.0  25.9    MCHC 32.5  31.9  33.0    RDW 13.7  13.9  14.1    Platelets 388  355  385      TSH          2/14/2024    11:16   TSH   TSH 1.650           Data reviewed: Cardiology studies                     Assessment and Plan        Diagnoses and all orders for this visit:    1. Palpitations (Primary)  -     Holter Monitor - 72 Hour Up To 15 Days    2. Cerebrovascular accident (CVA), unspecified mechanism    3. Chest heaviness    4. HTN (hypertension), benign    5. Smoker unmotivated to quit    Other orders  -     Cancel: ECG 12 Lead  -     Cancel: Electrical Cardioversion        Assessment  1.  Palpitation  2.  Strokelike symptoms with recent hospitalization  3.  History of multiple sclerosis  4.  Chest heaviness during palpitation  5.  Current smoker, unwilling to quit  6.  Anxiety/depression    Plan  - Patient's symptoms are likely neurological in origin given the history of multiple sclerosis, questionable stroke.  Patient follows up with neurology as outpatient.  We will obtain echocardiogram with bubble study to rule out intracardiac shunt.  - Will obtain 14 days Holter monitor for further workup of palpitation.  - Recommend follow-up with neurology.  - Will follow-up in 3 months or sooner if needed.      Corinne [facility]  reports that she has been smoking cigarettes. She started smoking about 3 years ago. She has a 1.6 pack-year smoking history. She has never used smokeless tobacco. I have educated her on the risk of diseases from using tobacco products such as cancer, COPD, and heart disease.     I advised her to quit and she is not willing to quit.    I spent 5 minutes counseling the patient.         Follow Up     Return in about 3 months (around 10/26/2024) for With Dr. Raya.    Patient was given instructions and  counseling regarding her condition or for health maintenance advice. Please see specific information pulled into the AVS if appropriate.       Beltran Raya MD  07/26/24  18:14 EDT    Dictated Utilizing Dragon Dictation

## 2024-07-26 ENCOUNTER — OFFICE VISIT (OUTPATIENT)
Dept: CARDIOLOGY | Facility: CLINIC | Age: 39
End: 2024-07-26
Payer: COMMERCIAL

## 2024-07-26 VITALS
WEIGHT: 260.4 LBS | BODY MASS INDEX: 44.46 KG/M2 | HEIGHT: 64 IN | SYSTOLIC BLOOD PRESSURE: 102 MMHG | DIASTOLIC BLOOD PRESSURE: 61 MMHG | HEART RATE: 82 BPM

## 2024-07-26 DIAGNOSIS — F17.200 SMOKER UNMOTIVATED TO QUIT: ICD-10-CM

## 2024-07-26 DIAGNOSIS — I63.9 CEREBROVASCULAR ACCIDENT (CVA), UNSPECIFIED MECHANISM: ICD-10-CM

## 2024-07-26 DIAGNOSIS — I10 HTN (HYPERTENSION), BENIGN: ICD-10-CM

## 2024-07-26 DIAGNOSIS — R00.2 PALPITATIONS: Primary | ICD-10-CM

## 2024-07-26 DIAGNOSIS — R07.89 CHEST HEAVINESS: ICD-10-CM

## 2024-07-26 RX ORDER — TOPIRAMATE 50 MG/1
1 TABLET, FILM COATED ORAL EVERY 12 HOURS SCHEDULED
COMMUNITY
Start: 2024-07-06

## 2024-07-26 RX ORDER — ATORVASTATIN CALCIUM 20 MG/1
20 TABLET, FILM COATED ORAL DAILY
Qty: 30 TABLET | Refills: 0 | Status: SHIPPED | OUTPATIENT
Start: 2024-07-26

## 2024-07-26 RX ORDER — ALBUTEROL SULFATE 90 UG/1
AEROSOL, METERED RESPIRATORY (INHALATION)
COMMUNITY
Start: 2024-06-17

## 2024-07-26 RX ORDER — AMITRIPTYLINE HYDROCHLORIDE 50 MG/1
50 TABLET, FILM COATED ORAL
COMMUNITY
Start: 2024-05-23

## 2024-07-26 NOTE — LETTER
July 26, 2024     VALERIY Dawson  [address] [facility]  [place] [address]    Patient: Corinne Del Torogerson   YOB: 1985   Date of Visit: 7/26/2024       Dear VALERIY Dawson,    Corinne Small was in my office today. Below are the relevant portions of my assessment and plan of care.           If you have questions, please do not hesitate to call me. I look forward to following Corinne along with you.         Sincerely,        Beltran Raya MD        CC: No Recipients

## 2024-08-08 ENCOUNTER — HOSPITAL ENCOUNTER (OUTPATIENT)
Dept: CARDIOLOGY | Facility: HOSPITAL | Age: 39
Discharge: HOME | End: 2024-08-08
Admitting: INTERNAL MEDICINE
Payer: COMMERCIAL

## 2024-08-08 DIAGNOSIS — R00.2 PALPITATIONS: ICD-10-CM

## 2024-08-08 DIAGNOSIS — I63.9 CEREBROVASCULAR ACCIDENT (CVA), UNSPECIFIED MECHANISM: ICD-10-CM

## 2024-08-08 DIAGNOSIS — R07.89 CHEST HEAVINESS: ICD-10-CM

## 2024-08-08 PROCEDURE — 93306 TTE W/DOPPLER COMPLETE: CPT

## 2024-08-10 LAB
BH CV ECHO MEAS - AO MAX PG: 7.2 MMHG
BH CV ECHO MEAS - AO MEAN PG: 4.4 MMHG
BH CV ECHO MEAS - AO ROOT DIAM: 2.22 CM
BH CV ECHO MEAS - AO V2 MAX: 134.4 CM/SEC
BH CV ECHO MEAS - AO V2 VTI: 22.5 CM
BH CV ECHO MEAS - AVA(I,D): 3 CM2
BH CV ECHO MEAS - EDV(CUBED): 93.2 ML
BH CV ECHO MEAS - EDV(MOD-SP2): 54.9 ML
BH CV ECHO MEAS - EDV(MOD-SP4): 48.6 ML
BH CV ECHO MEAS - EF(MOD-BP): 60.1 %
BH CV ECHO MEAS - EF(MOD-SP2): 62.8 %
BH CV ECHO MEAS - EF(MOD-SP4): 53.3 %
BH CV ECHO MEAS - ESV(CUBED): 23.8 ML
BH CV ECHO MEAS - ESV(MOD-SP2): 20.4 ML
BH CV ECHO MEAS - ESV(MOD-SP4): 22.7 ML
BH CV ECHO MEAS - FS: 36.5 %
BH CV ECHO MEAS - IVS/LVPW: 0.81 CM
BH CV ECHO MEAS - IVSD: 0.93 CM
BH CV ECHO MEAS - LA DIMENSION: 2.9 CM
BH CV ECHO MEAS - LAT PEAK E' VEL: 13.8 CM/SEC
BH CV ECHO MEAS - LV DIASTOLIC VOL/BSA (35-75): 22.2 CM2
BH CV ECHO MEAS - LV MASS(C)D: 163.1 GRAMS
BH CV ECHO MEAS - LV MAX PG: 5.8 MMHG
BH CV ECHO MEAS - LV MEAN PG: 3.4 MMHG
BH CV ECHO MEAS - LV SYSTOLIC VOL/BSA (12-30): 10.4 CM2
BH CV ECHO MEAS - LV V1 MAX: 120.9 CM/SEC
BH CV ECHO MEAS - LV V1 VTI: 20.9 CM
BH CV ECHO MEAS - LVIDD: 4.5 CM
BH CV ECHO MEAS - LVIDS: 2.9 CM
BH CV ECHO MEAS - LVOT AREA: 3.2 CM2
BH CV ECHO MEAS - LVOT DIAM: 2.02 CM
BH CV ECHO MEAS - LVPWD: 1.15 CM
BH CV ECHO MEAS - MED PEAK E' VEL: 9.2 CM/SEC
BH CV ECHO MEAS - MV A MAX VEL: 101.1 CM/SEC
BH CV ECHO MEAS - MV DEC SLOPE: 482 CM/SEC2
BH CV ECHO MEAS - MV DEC TIME: 0.18 SEC
BH CV ECHO MEAS - MV E MAX VEL: 86.5 CM/SEC
BH CV ECHO MEAS - MV E/A: 0.86
BH CV ECHO MEAS - RVDD: 2.44 CM
BH CV ECHO MEAS - SV(LVOT): 66.9 ML
BH CV ECHO MEAS - SV(MOD-SP2): 34.5 ML
BH CV ECHO MEAS - SV(MOD-SP4): 25.9 ML
BH CV ECHO MEAS - SVI(LVOT): 30.6 ML/M2
BH CV ECHO MEAS - SVI(MOD-SP2): 15.8 ML/M2
BH CV ECHO MEAS - SVI(MOD-SP4): 11.8 ML/M2
BH CV ECHO MEASUREMENTS AVERAGE E/E' RATIO: 7.52
BH CV ECHO SHUNT ASSESSMENT PERFORMED (HIDDEN SCRIPTING): 1
LEFT ATRIUM VOLUME INDEX: 6.9 ML/M2

## 2024-08-16 ENCOUNTER — HOSPITAL ENCOUNTER (EMERGENCY)
Facility: HOSPITAL | Age: 39
Discharge: LEFT BEFORE DISCHARGE | End: 2024-08-16
Payer: COMMERCIAL

## 2024-08-16 VITALS
SYSTOLIC BLOOD PRESSURE: 104 MMHG | RESPIRATION RATE: 18 BRPM | TEMPERATURE: 98.9 F | HEART RATE: 96 BPM | HEIGHT: 64 IN | DIASTOLIC BLOOD PRESSURE: 69 MMHG | WEIGHT: 257.94 LBS | OXYGEN SATURATION: 99 % | BODY MASS INDEX: 44.04 KG/M2

## 2024-08-16 LAB
ALBUMIN SERPL-MCNC: 4 G/DL (ref 3.5–5.2)
ALBUMIN/GLOB SERPL: 1.2 G/DL
ALP SERPL-CCNC: 80 U/L (ref 39–117)
ALT SERPL W P-5'-P-CCNC: 19 U/L (ref 1–33)
ANION GAP SERPL CALCULATED.3IONS-SCNC: 13 MMOL/L (ref 5–15)
AST SERPL-CCNC: 15 U/L (ref 1–32)
BASOPHILS # BLD AUTO: 0.04 10*3/MM3 (ref 0–0.2)
BASOPHILS NFR BLD AUTO: 0.4 % (ref 0–1.5)
BILIRUB SERPL-MCNC: 0.2 MG/DL (ref 0–1.2)
BUN SERPL-MCNC: 16 MG/DL (ref 6–20)
BUN/CREAT SERPL: 18.2 (ref 7–25)
CALCIUM SPEC-SCNC: 8.7 MG/DL (ref 8.6–10.5)
CHLORIDE SERPL-SCNC: 108 MMOL/L (ref 98–107)
CO2 SERPL-SCNC: 17 MMOL/L (ref 22–29)
CREAT SERPL-MCNC: 0.88 MG/DL (ref 0.57–1)
DEPRECATED RDW RBC AUTO: 43.6 FL (ref 37–54)
EGFRCR SERPLBLD CKD-EPI 2021: 85.3 ML/MIN/1.73
EOSINOPHIL # BLD AUTO: 0.31 10*3/MM3 (ref 0–0.4)
EOSINOPHIL NFR BLD AUTO: 3 % (ref 0.3–6.2)
ERYTHROCYTE [DISTWIDTH] IN BLOOD BY AUTOMATED COUNT: 15.1 % (ref 12.3–15.4)
GLOBULIN UR ELPH-MCNC: 3.3 GM/DL
GLUCOSE SERPL-MCNC: 110 MG/DL (ref 65–99)
HCT VFR BLD AUTO: 38.6 % (ref 34–46.6)
HGB BLD-MCNC: 12.6 G/DL (ref 12–15.9)
HOLD SPECIMEN: NORMAL
HOLD SPECIMEN: NORMAL
IMM GRANULOCYTES # BLD AUTO: 0.04 10*3/MM3 (ref 0–0.05)
IMM GRANULOCYTES NFR BLD AUTO: 0.4 % (ref 0–0.5)
LYMPHOCYTES # BLD AUTO: 1.98 10*3/MM3 (ref 0.7–3.1)
LYMPHOCYTES NFR BLD AUTO: 19.4 % (ref 19.6–45.3)
MAGNESIUM SERPL-MCNC: 1.9 MG/DL (ref 1.6–2.6)
MCH RBC QN AUTO: 26 PG (ref 26.6–33)
MCHC RBC AUTO-ENTMCNC: 32.6 G/DL (ref 31.5–35.7)
MCV RBC AUTO: 79.6 FL (ref 79–97)
MONOCYTES # BLD AUTO: 0.62 10*3/MM3 (ref 0.1–0.9)
MONOCYTES NFR BLD AUTO: 6.1 % (ref 5–12)
NEUTROPHILS NFR BLD AUTO: 7.19 10*3/MM3 (ref 1.7–7)
NEUTROPHILS NFR BLD AUTO: 70.7 % (ref 42.7–76)
NRBC BLD AUTO-RTO: 0 /100 WBC (ref 0–0.2)
PLATELET # BLD AUTO: 387 10*3/MM3 (ref 140–450)
PMV BLD AUTO: 9.6 FL (ref 6–12)
POTASSIUM SERPL-SCNC: 4 MMOL/L (ref 3.5–5.2)
PROT SERPL-MCNC: 7.3 G/DL (ref 6–8.5)
RBC # BLD AUTO: 4.85 10*6/MM3 (ref 3.77–5.28)
SODIUM SERPL-SCNC: 138 MMOL/L (ref 136–145)
TROPONIN T SERPL HS-MCNC: <6 NG/L
WBC NRBC COR # BLD AUTO: 10.18 10*3/MM3 (ref 3.4–10.8)
WHOLE BLOOD HOLD COAG: NORMAL
WHOLE BLOOD HOLD SPECIMEN: NORMAL

## 2024-08-16 PROCEDURE — 85025 COMPLETE CBC W/AUTO DIFF WBC: CPT

## 2024-08-16 PROCEDURE — 93005 ELECTROCARDIOGRAM TRACING: CPT

## 2024-08-16 PROCEDURE — 99211 OFF/OP EST MAY X REQ PHY/QHP: CPT

## 2024-08-16 PROCEDURE — 83735 ASSAY OF MAGNESIUM: CPT

## 2024-08-16 PROCEDURE — 80053 COMPREHEN METABOLIC PANEL: CPT

## 2024-08-16 PROCEDURE — 84484 ASSAY OF TROPONIN QUANT: CPT

## 2024-08-16 RX ORDER — SODIUM CHLORIDE 0.9 % (FLUSH) 0.9 %
10 SYRINGE (ML) INJECTION AS NEEDED
Status: DISCONTINUED | OUTPATIENT
Start: 2024-08-16 | End: 2024-08-16 | Stop reason: HOSPADM

## 2024-08-18 LAB
QT INTERVAL: 381 MS
QTC INTERVAL: 459 MS

## 2024-09-25 ENCOUNTER — TELEPHONE (OUTPATIENT)
Dept: CARDIOLOGY | Facility: CLINIC | Age: 39
End: 2024-09-25
Payer: COMMERCIAL

## 2024-10-08 ENCOUNTER — OFFICE VISIT (OUTPATIENT)
Dept: CARDIOLOGY | Facility: CLINIC | Age: 39
End: 2024-10-08
Payer: COMMERCIAL

## 2024-10-08 VITALS
HEIGHT: 64 IN | HEART RATE: 77 BPM | DIASTOLIC BLOOD PRESSURE: 88 MMHG | WEIGHT: 259.4 LBS | BODY MASS INDEX: 44.28 KG/M2 | SYSTOLIC BLOOD PRESSURE: 137 MMHG

## 2024-10-08 DIAGNOSIS — F17.200 SMOKER UNMOTIVATED TO QUIT: ICD-10-CM

## 2024-10-08 DIAGNOSIS — E78.5 HYPERLIPIDEMIA, UNSPECIFIED HYPERLIPIDEMIA TYPE: ICD-10-CM

## 2024-10-08 DIAGNOSIS — R55 SYNCOPE AND COLLAPSE: Primary | ICD-10-CM

## 2024-10-08 DIAGNOSIS — I10 ESSENTIAL HYPERTENSION: ICD-10-CM

## 2024-10-08 RX ORDER — ATORVASTATIN CALCIUM 20 MG/1
20 TABLET, FILM COATED ORAL DAILY
Qty: 90 TABLET | Refills: 3 | Status: SHIPPED | OUTPATIENT
Start: 2024-10-08

## 2024-10-08 NOTE — PROGRESS NOTES
[facility]  INTERVENTIONAL CARDIOLOGY FOLLOW-UP PROGRESS NOTE        Chief Complaint  Follow-up (Echo)    Subjective            History of Present Illness  Corinne Small is a 39 y.o. female who presents to [facility] CARDIOLOGY.  Patient has approximately history of multiple sclerosis, stroke, smoking, hypertension, who initially presented to cardiology clinic after ED visit for strokelike symptoms and negative CT scan of head and neck.  Patient was ordered echocardiogram and Holter monitor.  Echo showed normal EF with no significant wall motion abnormalities and valvular abnormalities.  Bubble study was negative for intracardiac shunt.  Holter monitor for 14 days showed no significant arrhythmia however patient had sinus tachycardia for 25% of the time.  No significant pauses/A-fib or flutter were seen.  Apparently patient had a syncopal episode on 2024 when she was in the driveway and passed out for unknown time.  Patient denies chest pain, shortness of breath at this time.        Past History:  Past Medical History:   Diagnosis Date    Abnormal ECG 6/10/24    Anxiety     Arrhythmia 21    Asthma 6/10/24    Clotting disorder 6/10/24    Hyperlipidemia 10/8/2024    Hypertension 10/10/2015    MS (multiple sclerosis)     RA (rheumatoid arthritis)     Stroke        Medical History:  Past Medical History:   Diagnosis Date    Abnormal ECG 6/10/24    Anxiety     Arrhythmia 21    Asthma 6/10/24    Clotting disorder 6/10/24    Hyperlipidemia 10/8/2024    Hypertension 10/10/2015    MS (multiple sclerosis)     RA (rheumatoid arthritis)     Stroke        Surgical History:   has a past surgical history that includes  section.     Family History:   family history includes Anemia in her maternal grandmother; Arrhythmia in her maternal grandfather; Clotting disorder in her maternal grandmother; Fainting in her mother; Heart attack in her maternal grandfather; Heart  "disease in her maternal grandfather, mother, and another family member; Heart failure in her maternal grandfather; Hypertension in her mother.     Social History:   reports that she has been smoking cigarettes. She started smoking about 3 years ago. She has a 1.7 pack-year smoking history. She has never used smokeless tobacco. She reports that she does not currently use alcohol. She reports that she does not currently use drugs.    Allergies:   Strawberry extract, Clindamycin/lincomycin, Latex, Methylprednisolone, Solu-medrol [methylprednisolone sodium succ], and Lisinopril    Current Outpatient Medications on File Prior to Visit   Medication Sig    albuterol sulfate  (90 Base) MCG/ACT inhaler INHALE 2 PUFFS BY MOUTH EVERY 4 TO 6 HOURS AS NEEDED FOR SHORTNESS OF BREATH OR WHEEZING    amitriptyline (ELAVIL) 50 MG tablet Take 1 tablet by mouth every night at bedtime.    aspirin 81 MG chewable tablet Chew 1 tablet Daily.    cloNIDine (CATAPRES) 0.2 MG tablet Take 1 tablet by mouth 2 (Two) Times a Day.    Cymbalta 30 MG capsule Take 1 capsule by mouth Daily.    FLUoxetine (PROzac) 20 MG capsule Take 1 capsule by mouth Daily.    ibuprofen (ADVIL,MOTRIN) 200 MG tablet Take 1 tablet by mouth Every 6 (Six) Hours As Needed for Mild Pain.    topiramate (TOPAMAX) 50 MG tablet Take 1 tablet by mouth Every 12 (Twelve) Hours.    [DISCONTINUED] atorvastatin (LIPITOR) 20 MG tablet Take 1 tablet by mouth Daily.     No current facility-administered medications on file prior to visit.          Review of Systems   Negative except as mentioned in HPI above.    Objective     /88   Pulse 77   Ht 162.6 cm (64.02\")   Wt 118 kg (259 lb 6.4 oz)   BMI 44.50 kg/m²       Physical Exam    General : Alert, awake, no acute distress  Neck : Supple, no carotid bruit, no jugular venous distention  CVS : Regular rate and rhythm, no murmur, rubs or gallops  Lungs: Clear to auscultation bilaterally, no crackles or rhonchi  Abdomen: Soft, " nontender, bowel sounds heard in all 4 quadrants  Extremities: Warm, well-perfused, no pedal edema    Result Review :     The following data was reviewed by: Beltran Raya MD on 10/08/2024:    CMP          2/28/2024    13:13 6/4/2024    12:59 8/16/2024    12:57   CMP   Glucose 137  105  110    BUN 9  15  16    Creatinine 0.70  0.78  0.88    EGFR 113.0  98.6  85.3    Sodium 138  140  138    Potassium 3.9  4.0  4.0    Chloride 104  108  108    Calcium 8.6  8.9  8.7    Total Protein 6.7  7.3  7.3    Albumin 3.6  4.2  4.0    Globulin 3.1  3.1  3.3    Total Bilirubin <0.2  <0.2  0.2    Alkaline Phosphatase 66  76  80    AST (SGOT) 15  16  15    ALT (SGPT) 19  22  19    Albumin/Globulin Ratio 1.2  1.4  1.2    BUN/Creatinine Ratio 12.9  19.2  18.2    Anion Gap 10.1  11.3  13.0      CBC          2/28/2024    13:13 6/4/2024    12:59 8/16/2024    12:57   CBC   WBC 7.95  9.70  10.18    RBC 4.61  4.78  4.85    Hemoglobin 12.0  12.4  12.6    Hematocrit 37.6  37.6  38.6    MCV 81.6  78.7  79.6    MCH 26.0  25.9  26.0    MCHC 31.9  33.0  32.6    RDW 13.9  14.1  15.1    Platelets 355  385  387      TSH          2/14/2024    11:16   TSH   TSH 1.650               Data reviewed: Cardiology studies    Results for orders placed during the hospital encounter of 08/08/24    Adult Transthoracic Echo Complete w/ Color, Spectral and Contrast if necessary per protocol    Interpretation Summary    Left ventricular ejection fraction appears to be 61 - 65%.    Left ventricular diastolic function was normal.    Saline test results are negative.      Procedures  No results found for this or any previous visit.        ASCVD Score  The ASCVD Risk score (Macie FERRARA, et al., 2019) failed to calculate for the following reasons:    The patient has a prior MI or stroke diagnosis         Assessment and Plan          Diagnoses and all orders for this visit:    1. Syncope and collapse (Primary)    2. Smoker unmotivated to quit  -     atorvastatin  (LIPITOR) 20 MG tablet; Take 1 tablet by mouth Daily.  Dispense: 90 tablet; Refill: 3  -     Case Request Cath Lab: Loop insertion    3. Essential hypertension    4. Hyperlipidemia, unspecified hyperlipidemia type          Plan  - Patient with recurrent syncopal episodes with negative event monitor and echocardiographic evaluation for cause of syncope.  Patient also has multiple sclerosis which could contribute to her symptoms.  Will obtain loop recorder implant for further evaluation.  - Will refill atorvastatin 20 mg daily.  - Will follow-up in 6 months or sooner if needed.      Patient was educated on heart healthy diet, daily exercise and achieving optimal weight.     Follow Up     Return in about 6 months (around 4/8/2025) for Next scheduled follow up, With Chetna Cameron.      Corinne [facility]  reports that she has been smoking cigarettes. She started smoking about 3 years ago. She has a 1.7 pack-year smoking history. She has never used smokeless tobacco. I have educated her on the risk of diseases from using tobacco products such as cancer, COPD, and heart disease.     I advised her to quit and she is not willing to quit.                 I spent 30 minutes caring for this patient on this date of service. This time includes time spent by me in the following activities:preparing for the visit, reviewing tests, obtaining and/or reviewing a separately obtained history, performing a medically appropriate examination and/or evaluation , counseling and educating the patient/family/caregiver, ordering medications, tests, or procedures, referring and communicating with other health care professionals , documenting information in the medical record, independently interpreting results and communicating that information with the patient/family/caregiver, and care coordination.     This is an acute or chronic illness that poses a threat to life or bodily function. The above treatment plan involves a high risk of  complications and/or mortality of patient management.    The patient was seen and examined. Work by the provider also included review and/or ordering of lab tests, review and/or ordering of radiology tests, review and/or ordering of medicine tests, discussion with other physicians or providers, independent review of data, obtaining old records, review/summation of old records, and/or other review.    I have reviewed the family history, social history, and past medical history for this patient. Previous information and data has been reviewed and updated as needed. I have reviewed and verified the chief complaint, history, and other documentation. The patient was interviewed and examined in the clinic and the chart reviewed. The previous observations, recommendations, and conclusions were reviewed including those of other providers.     The plan was discussed with the patient and/or family. The patient was given time to ask questions and these questions were answered. At the conclusion of their visit they had no additional questions or concerns and all questions were answered to their satisfaction.     Patient was given instructions and counseling regarding her condition or for health maintenance advice. Please see specific information pulled into the AVS if appropriate.      Beltran Raya MD, FACC  10/08/24  11:21 EDT    Dictated Utilizing Dragon Dictation

## 2024-10-08 NOTE — LETTER
October 8, 2024     VALERIY Dawson  [address] [facility]  [place] [address]    Patient: Corinne Del Torogerson   YOB: 1985   Date of Visit: 10/8/2024       Dear VALERIY Dawson,    Corinne Small was in my office today. Below are the relevant portions of my assessment and plan of care.           If you have questions, please do not hesitate to call me. I look forward to following Corinne along with you.         Sincerely,        Beltran Raya MD        CC: No Recipients

## 2024-10-08 NOTE — H&P (VIEW-ONLY)
[facility]  INTERVENTIONAL CARDIOLOGY FOLLOW-UP PROGRESS NOTE        Chief Complaint  Follow-up (Echo)    Subjective            History of Present Illness  Corinne Small is a 39 y.o. female who presents to [facility] CARDIOLOGY.  Patient has approximately history of multiple sclerosis, stroke, smoking, hypertension, who initially presented to cardiology clinic after ED visit for strokelike symptoms and negative CT scan of head and neck.  Patient was ordered echocardiogram and Holter monitor.  Echo showed normal EF with no significant wall motion abnormalities and valvular abnormalities.  Bubble study was negative for intracardiac shunt.  Holter monitor for 14 days showed no significant arrhythmia however patient had sinus tachycardia for 25% of the time.  No significant pauses/A-fib or flutter were seen.  Apparently patient had a syncopal episode on 2024 when she was in the driveway and passed out for unknown time.  Patient denies chest pain, shortness of breath at this time.        Past History:  Past Medical History:   Diagnosis Date    Abnormal ECG 6/10/24    Anxiety     Arrhythmia 21    Asthma 6/10/24    Clotting disorder 6/10/24    Hyperlipidemia 10/8/2024    Hypertension 10/10/2015    MS (multiple sclerosis)     RA (rheumatoid arthritis)     Stroke        Medical History:  Past Medical History:   Diagnosis Date    Abnormal ECG 6/10/24    Anxiety     Arrhythmia 21    Asthma 6/10/24    Clotting disorder 6/10/24    Hyperlipidemia 10/8/2024    Hypertension 10/10/2015    MS (multiple sclerosis)     RA (rheumatoid arthritis)     Stroke        Surgical History:   has a past surgical history that includes  section.     Family History:   family history includes Anemia in her maternal grandmother; Arrhythmia in her maternal grandfather; Clotting disorder in her maternal grandmother; Fainting in her mother; Heart attack in her maternal grandfather; Heart  "disease in her maternal grandfather, mother, and another family member; Heart failure in her maternal grandfather; Hypertension in her mother.     Social History:   reports that she has been smoking cigarettes. She started smoking about 3 years ago. She has a 1.7 pack-year smoking history. She has never used smokeless tobacco. She reports that she does not currently use alcohol. She reports that she does not currently use drugs.    Allergies:   Strawberry extract, Clindamycin/lincomycin, Latex, Methylprednisolone, Solu-medrol [methylprednisolone sodium succ], and Lisinopril    Current Outpatient Medications on File Prior to Visit   Medication Sig    albuterol sulfate  (90 Base) MCG/ACT inhaler INHALE 2 PUFFS BY MOUTH EVERY 4 TO 6 HOURS AS NEEDED FOR SHORTNESS OF BREATH OR WHEEZING    amitriptyline (ELAVIL) 50 MG tablet Take 1 tablet by mouth every night at bedtime.    aspirin 81 MG chewable tablet Chew 1 tablet Daily.    cloNIDine (CATAPRES) 0.2 MG tablet Take 1 tablet by mouth 2 (Two) Times a Day.    Cymbalta 30 MG capsule Take 1 capsule by mouth Daily.    FLUoxetine (PROzac) 20 MG capsule Take 1 capsule by mouth Daily.    ibuprofen (ADVIL,MOTRIN) 200 MG tablet Take 1 tablet by mouth Every 6 (Six) Hours As Needed for Mild Pain.    topiramate (TOPAMAX) 50 MG tablet Take 1 tablet by mouth Every 12 (Twelve) Hours.    [DISCONTINUED] atorvastatin (LIPITOR) 20 MG tablet Take 1 tablet by mouth Daily.     No current facility-administered medications on file prior to visit.          Review of Systems   Negative except as mentioned in HPI above.    Objective     /88   Pulse 77   Ht 162.6 cm (64.02\")   Wt 118 kg (259 lb 6.4 oz)   BMI 44.50 kg/m²       Physical Exam    General : Alert, awake, no acute distress  Neck : Supple, no carotid bruit, no jugular venous distention  CVS : Regular rate and rhythm, no murmur, rubs or gallops  Lungs: Clear to auscultation bilaterally, no crackles or rhonchi  Abdomen: Soft, " nontender, bowel sounds heard in all 4 quadrants  Extremities: Warm, well-perfused, no pedal edema    Result Review :     The following data was reviewed by: Beltran Raya MD on 10/08/2024:    CMP          2/28/2024    13:13 6/4/2024    12:59 8/16/2024    12:57   CMP   Glucose 137  105  110    BUN 9  15  16    Creatinine 0.70  0.78  0.88    EGFR 113.0  98.6  85.3    Sodium 138  140  138    Potassium 3.9  4.0  4.0    Chloride 104  108  108    Calcium 8.6  8.9  8.7    Total Protein 6.7  7.3  7.3    Albumin 3.6  4.2  4.0    Globulin 3.1  3.1  3.3    Total Bilirubin <0.2  <0.2  0.2    Alkaline Phosphatase 66  76  80    AST (SGOT) 15  16  15    ALT (SGPT) 19  22  19    Albumin/Globulin Ratio 1.2  1.4  1.2    BUN/Creatinine Ratio 12.9  19.2  18.2    Anion Gap 10.1  11.3  13.0      CBC          2/28/2024    13:13 6/4/2024    12:59 8/16/2024    12:57   CBC   WBC 7.95  9.70  10.18    RBC 4.61  4.78  4.85    Hemoglobin 12.0  12.4  12.6    Hematocrit 37.6  37.6  38.6    MCV 81.6  78.7  79.6    MCH 26.0  25.9  26.0    MCHC 31.9  33.0  32.6    RDW 13.9  14.1  15.1    Platelets 355  385  387      TSH          2/14/2024    11:16   TSH   TSH 1.650               Data reviewed: Cardiology studies    Results for orders placed during the hospital encounter of 08/08/24    Adult Transthoracic Echo Complete w/ Color, Spectral and Contrast if necessary per protocol    Interpretation Summary    Left ventricular ejection fraction appears to be 61 - 65%.    Left ventricular diastolic function was normal.    Saline test results are negative.      Procedures  No results found for this or any previous visit.        ASCVD Score  The ASCVD Risk score (Macie FERRARA, et al., 2019) failed to calculate for the following reasons:    The patient has a prior MI or stroke diagnosis         Assessment and Plan          Diagnoses and all orders for this visit:    1. Syncope and collapse (Primary)    2. Smoker unmotivated to quit  -     atorvastatin  (LIPITOR) 20 MG tablet; Take 1 tablet by mouth Daily.  Dispense: 90 tablet; Refill: 3  -     Case Request Cath Lab: Loop insertion    3. Essential hypertension    4. Hyperlipidemia, unspecified hyperlipidemia type          Plan  - Patient with recurrent syncopal episodes with negative event monitor and echocardiographic evaluation for cause of syncope.  Patient also has multiple sclerosis which could contribute to her symptoms.  Will obtain loop recorder implant for further evaluation.  - Will refill atorvastatin 20 mg daily.  - Will follow-up in 6 months or sooner if needed.      Patient was educated on heart healthy diet, daily exercise and achieving optimal weight.     Follow Up     Return in about 6 months (around 4/8/2025) for Next scheduled follow up, With Chetna Cameron.      Corinne Theodore [facility]  reports that she has been smoking cigarettes. She started smoking about 3 years ago. She has a 1.7 pack-year smoking history. She has never used smokeless tobacco. I have educated her on the risk of diseases from using tobacco products such as cancer, COPD, and heart disease.     I advised her to quit and she is not willing to quit.                 I spent 30 minutes caring for this patient on this date of service. This time includes time spent by me in the following activities:preparing for the visit, reviewing tests, obtaining and/or reviewing a separately obtained history, performing a medically appropriate examination and/or evaluation , counseling and educating the patient/family/caregiver, ordering medications, tests, or procedures, referring and communicating with other health care professionals , documenting information in the medical record, independently interpreting results and communicating that information with the patient/family/caregiver, and care coordination.     This is an acute or chronic illness that poses a threat to life or bodily function. The above treatment plan involves a high risk of  complications and/or mortality of patient management.    The patient was seen and examined. Work by the provider also included review and/or ordering of lab tests, review and/or ordering of radiology tests, review and/or ordering of medicine tests, discussion with other physicians or providers, independent review of data, obtaining old records, review/summation of old records, and/or other review.    I have reviewed the family history, social history, and past medical history for this patient. Previous information and data has been reviewed and updated as needed. I have reviewed and verified the chief complaint, history, and other documentation. The patient was interviewed and examined in the clinic and the chart reviewed. The previous observations, recommendations, and conclusions were reviewed including those of other providers.     The plan was discussed with the patient and/or family. The patient was given time to ask questions and these questions were answered. At the conclusion of their visit they had no additional questions or concerns and all questions were answered to their satisfaction.     Patient was given instructions and counseling regarding her condition or for health maintenance advice. Please see specific information pulled into the AVS if appropriate.      Beltran Raya MD, FACC  10/08/24  11:21 EDT    Dictated Utilizing Dragon Dictation

## 2024-10-09 ENCOUNTER — TELEPHONE (OUTPATIENT)
Dept: CARDIOLOGY | Facility: CLINIC | Age: 39
End: 2024-10-09
Payer: COMMERCIAL

## 2024-10-09 NOTE — TELEPHONE ENCOUNTER
I spoke to patient and gave an arrival time of 8:00 on 10/10/24 for [employer] loop recorder implant. Patient was instructed to have a  for the day of the procedure and to arrive at the entrance C registration area. Patient was instructed to continue all medications as usual. Patient was instructed to be NPO after midnight with only sips of water as needed. Patient is agreeable with no other questions or concerns.

## 2024-10-10 ENCOUNTER — TELEPHONE (OUTPATIENT)
Dept: CARDIOLOGY | Facility: CLINIC | Age: 39
End: 2024-10-10

## 2024-10-10 ENCOUNTER — HOSPITAL ENCOUNTER (OUTPATIENT)
Facility: HOSPITAL | Age: 39
Setting detail: HOSPITAL OUTPATIENT SURGERY
Discharge: HOME | End: 2024-10-10
Attending: INTERNAL MEDICINE | Admitting: INTERNAL MEDICINE
Payer: COMMERCIAL

## 2024-10-10 VITALS
SYSTOLIC BLOOD PRESSURE: 149 MMHG | HEART RATE: 85 BPM | DIASTOLIC BLOOD PRESSURE: 95 MMHG | BODY MASS INDEX: 44.38 KG/M2 | HEIGHT: 64 IN | OXYGEN SATURATION: 99 % | WEIGHT: 259.92 LBS | RESPIRATION RATE: 20 BRPM

## 2024-10-10 DIAGNOSIS — F17.200 SMOKER UNMOTIVATED TO QUIT: ICD-10-CM

## 2024-10-10 PROCEDURE — 33285 INSJ SUBQ CAR RHYTHM MNTR: CPT | Performed by: INTERNAL MEDICINE

## 2024-10-10 PROCEDURE — C1764 EVENT RECORDER, CARDIAC: HCPCS | Performed by: INTERNAL MEDICINE

## 2024-10-10 PROCEDURE — 25010000002 LIDOCAINE 2% SOLUTION: Performed by: INTERNAL MEDICINE

## 2024-10-10 DEVICE — ICM LP/RECRD CARD LUX/DX/PLUS: Type: IMPLANTABLE DEVICE | Status: FUNCTIONAL

## 2024-10-10 RX ORDER — LIDOCAINE HYDROCHLORIDE 20 MG/ML
INJECTION, SOLUTION INFILTRATION; PERINEURAL
Status: DISCONTINUED | OUTPATIENT
Start: 2024-10-10 | End: 2024-10-10 | Stop reason: HOSPADM

## 2024-10-10 NOTE — TELEPHONE ENCOUNTER
Caller: JAYLEEN ([facility])    Relationship to patient: Provider    Best call back number: CALL PT [phone]     New or established patient?  [] New  [x] Established    Date of discharge:10.10.24    Facility discharged from: [facility]    Diagnosis/Symptoms: LOOP RECORDER PLACEMENT    Length of stay (If applicable): 2 DAYS    Specialty Only: Did you see a [facility] health provider?    [x] Yes  [] No  If so, who? DR. PHELPS

## 2024-10-11 ENCOUNTER — TELEPHONE (OUTPATIENT)
Dept: CARDIOLOGY | Facility: CLINIC | Age: 39
End: 2024-10-11
Payer: COMMERCIAL

## 2024-10-11 NOTE — TELEPHONE ENCOUNTER
DARVIN patient. Patient had loop recorder placed yesterday, 10/10/24. Patient states when woke this am stitch had fallen out and bleeding.  States she placed super glue gel over site. Patient states site is not open or bleeding. Advised patient to monitor for signs of infection. Advised to call office with any further issues.

## 2024-10-15 ENCOUNTER — OFFICE VISIT (OUTPATIENT)
Dept: CARDIOLOGY | Facility: CLINIC | Age: 39
End: 2024-10-15
Payer: COMMERCIAL

## 2024-10-15 VITALS
HEART RATE: 91 BPM | WEIGHT: 262 LBS | SYSTOLIC BLOOD PRESSURE: 131 MMHG | DIASTOLIC BLOOD PRESSURE: 89 MMHG | BODY MASS INDEX: 44.73 KG/M2 | HEIGHT: 64 IN

## 2024-10-15 DIAGNOSIS — F17.200 SMOKER UNMOTIVATED TO QUIT: ICD-10-CM

## 2024-10-15 DIAGNOSIS — R55 SYNCOPE AND COLLAPSE: Primary | ICD-10-CM

## 2024-10-15 DIAGNOSIS — I10 ESSENTIAL HYPERTENSION: ICD-10-CM

## 2024-10-15 PROCEDURE — 3075F SYST BP GE 130 - 139MM HG: CPT | Performed by: NURSE PRACTITIONER

## 2024-10-15 PROCEDURE — 99214 OFFICE O/P EST MOD 30 MIN: CPT | Performed by: NURSE PRACTITIONER

## 2024-10-15 PROCEDURE — 3079F DIAST BP 80-89 MM HG: CPT | Performed by: NURSE PRACTITIONER

## 2024-10-15 RX ORDER — PREDNISONE 10 MG/1
TABLET ORAL
COMMUNITY
Start: 2024-10-14
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
## 2024-10-21 ENCOUNTER — CLINICAL SUPPORT NO REQUIREMENTS (OUTPATIENT)
Dept: CARDIOLOGY | Facility: CLINIC | Age: 39
End: 2024-10-21
Payer: COMMERCIAL

## 2024-10-21 DIAGNOSIS — Z95.818 IMPLANTABLE LOOP RECORDER PRESENT: ICD-10-CM

## 2024-10-21 DIAGNOSIS — R55 SYNCOPE AND COLLAPSE: Primary | ICD-10-CM

## 2024-10-23 NOTE — PROGRESS NOTES
Normal [facility] LOOP RECORDER Device Interrogation and Device Testing.  Normal evaluation of device function and lead measurements.  No optimization was needed of parameters or maximization of device longevity.  Patient is on automated Home Remote Monitoring.  Patients wound incision is healing and all corners are intact.  There are no signs of infection, no drainage, no swelling and cool to touch.  We reviewed Home remote follow up and the monitoring machine.  We also discussed the six week healing process and the do's and don'ts of activity that it specific to the patient.  Implanted for Cryptogenic stroke.

## 2024-11-05 ENCOUNTER — OFFICE VISIT (OUTPATIENT)
Dept: CARDIOLOGY | Facility: CLINIC | Age: 39
End: 2024-11-05
Payer: COMMERCIAL

## 2024-11-05 VITALS
HEIGHT: 64 IN | HEART RATE: 86 BPM | BODY MASS INDEX: 44.56 KG/M2 | SYSTOLIC BLOOD PRESSURE: 149 MMHG | DIASTOLIC BLOOD PRESSURE: 107 MMHG | WEIGHT: 261 LBS

## 2024-11-05 DIAGNOSIS — F17.200 SMOKER UNMOTIVATED TO QUIT: ICD-10-CM

## 2024-11-05 DIAGNOSIS — I63.9 CRYPTOGENIC STROKE: ICD-10-CM

## 2024-11-05 DIAGNOSIS — E78.5 HYPERLIPIDEMIA, UNSPECIFIED HYPERLIPIDEMIA TYPE: ICD-10-CM

## 2024-11-05 DIAGNOSIS — E66.01 MORBID OBESITY: ICD-10-CM

## 2024-11-05 DIAGNOSIS — R55 SYNCOPE AND COLLAPSE: ICD-10-CM

## 2024-11-05 DIAGNOSIS — I10 ESSENTIAL HYPERTENSION: Primary | ICD-10-CM

## 2024-11-05 DIAGNOSIS — Z95.818 STATUS POST PLACEMENT OF IMPLANTABLE LOOP RECORDER: ICD-10-CM

## 2024-11-05 NOTE — PROGRESS NOTES
[facility]  INTERVENTIONAL CARDIOLOGY FOLLOW-UP PROGRESS NOTE        Chief Complaint  Follow-up (Loop Recorder)    Subjective            History of Present Illness  Corinne Small is a 39 y.o. female who presents to [facility] CARDIOLOGY    Patient recently underwent loop recorder implantation for cryptogenic stroke.  Patient had some exercise issues with the suture coming out and went to urgent care where she was told she has allergic reaction to the Dermabond.  She was followed up in the clinic with my nurse practitioner with the wound had healed.  Patient today comes for follow-up.  She denies any recent syncope or presyncopal episodes.  She denies strokelike symptoms.  She works as a .  She occasionally gets anxiety attacks.   Last loop interrogation from 10/13/2024 showed 0% atrial tachycardia/atrial for burden.  No significant findings were found.    Past History:  Past Medical History:   Diagnosis Date    Abnormal ECG 6/10/24    Anxiety     Arrhythmia 21    Asthma 6/10/24    Clotting disorder 6/10/24    Hyperlipidemia 10/08/2024    Hypertension 10/10/2015    MS (multiple sclerosis)     RA (rheumatoid arthritis)     Stroke     issues with right eye       Medical History:  Past Medical History:   Diagnosis Date    Abnormal ECG 6/10/24    Anxiety     Arrhythmia 21    Asthma 6/10/24    Clotting disorder 6/10/24    Hyperlipidemia 10/08/2024    Hypertension 10/10/2015    MS (multiple sclerosis)     RA (rheumatoid arthritis)     Stroke     issues with right eye       Surgical History:   has a past surgical history that includes  section; Tubal ligation; Knee surgery; and Cardiac electrophysiology procedure (N/A, 10/10/2024).     Family History:   family history includes Anemia in her maternal grandmother; Arrhythmia in her maternal grandfather; Clotting disorder in her maternal grandmother; Fainting in her mother; Heart attack in her maternal  "grandfather; Heart disease in her maternal grandfather, mother, and another family member; Heart failure in her maternal grandfather; Hypertension in her mother.     Social History:   reports that she has been smoking cigarettes. She started smoking about 10 years ago. She has a 5.2 pack-year smoking history. She has never used smokeless tobacco. She reports that she does not currently use alcohol. She reports that she does not currently use drugs after having used the following drugs: Methamphetamines.    Allergies:   Strawberry extract, Clindamycin/lincomycin, Latex, Methylprednisolone, Solu-medrol [methylprednisolone sodium succ], Dermabond [wound dressing adhesive], and Lisinopril    Current Outpatient Medications on File Prior to Visit   Medication Sig    albuterol sulfate  (90 Base) MCG/ACT inhaler INHALE 2 PUFFS BY MOUTH EVERY 4 TO 6 HOURS AS NEEDED FOR SHORTNESS OF BREATH OR WHEEZING    amitriptyline (ELAVIL) 50 MG tablet Take 1 tablet by mouth every night at bedtime.    aspirin 81 MG chewable tablet Chew 1 tablet Daily.    atorvastatin (LIPITOR) 20 MG tablet Take 1 tablet by mouth Daily.    cloNIDine (CATAPRES) 0.2 MG tablet Take 1 tablet by mouth 2 (Two) Times a Day.    Cymbalta 30 MG capsule Take 1 capsule by mouth Daily.    FLUoxetine (PROzac) 20 MG capsule Take 1 capsule by mouth Daily.    ibuprofen (ADVIL,MOTRIN) 200 MG tablet Take 1 tablet by mouth Every 6 (Six) Hours As Needed for Mild Pain.    topiramate (TOPAMAX) 50 MG tablet Take 1 tablet by mouth Every 12 (Twelve) Hours.    predniSONE (DELTASONE) 10 MG tablet      No current facility-administered medications on file prior to visit.          Review of Systems   Negative except as mentioned in HPI above.    Objective     BP (!) 149/107   Pulse 86   Ht 162.6 cm (64.02\")   Wt 118 kg (261 lb)   BMI 44.78 kg/m²       Physical Exam    General : Alert, awake, no acute distress  Neck : Supple, no carotid bruit, no jugular venous distention  CVS " : Regular rate and rhythm, no murmur, rubs or gallops  Lungs: Clear to auscultation bilaterally, no crackles or rhonchi  Abdomen: Soft, nontender, bowel sounds heard in all 4 quadrants  Extremities: Warm, well-perfused, no pedal edema    Result Review :     The following data was reviewed by: Beltran Raya MD on 11/05/2024:    CMP          2/28/2024    13:13 6/4/2024    12:59 8/16/2024    12:57   CMP   Glucose 137  105  110    BUN 9  15  16    Creatinine 0.70  0.78  0.88    EGFR 113.0  98.6  85.3    Sodium 138  140  138    Potassium 3.9  4.0  4.0    Chloride 104  108  108    Calcium 8.6  8.9  8.7    Total Protein 6.7  7.3  7.3    Albumin 3.6  4.2  4.0    Globulin 3.1  3.1  3.3    Total Bilirubin <0.2  <0.2  0.2    Alkaline Phosphatase 66  76  80    AST (SGOT) 15  16  15    ALT (SGPT) 19  22  19    Albumin/Globulin Ratio 1.2  1.4  1.2    BUN/Creatinine Ratio 12.9  19.2  18.2    Anion Gap 10.1  11.3  13.0      CBC          2/28/2024    13:13 6/4/2024    12:59 8/16/2024    12:57   CBC   WBC 7.95  9.70  10.18    RBC 4.61  4.78  4.85    Hemoglobin 12.0  12.4  12.6    Hematocrit 37.6  37.6  38.6    MCV 81.6  78.7  79.6    MCH 26.0  25.9  26.0    MCHC 31.9  33.0  32.6    RDW 13.9  14.1  15.1    Platelets 355  385  387      TSH          2/14/2024    11:16   TSH   TSH 1.650               Data reviewed: Cardiology studies    Results for orders placed during the hospital encounter of 08/08/24    Adult Transthoracic Echo Complete w/ Color, Spectral and Contrast if necessary per protocol    Interpretation Summary    Left ventricular ejection fraction appears to be 61 - 65%.    Left ventricular diastolic function was normal.    Saline test results are negative.      Procedures    ASCVD Score  The ASCVD Risk score (Macie FERRARA, et al., 2019) failed to calculate for the following reasons:    Risk score cannot be calculated because patient has a medical history suggesting prior/existing ASCVD         Assessment and Plan           Diagnoses and all orders for this visit:    1. Essential hypertension (Primary)    2. Hyperlipidemia, unspecified hyperlipidemia type    3. Morbid obesity    4. Smoker unmotivated to quit    5. Syncope and collapse    6. Status post placement of implantable loop recorder    7. Cryptogenic stroke          Plan  - Normally functioning loop device without concerning findings.  - The suture site has healed properly.  Patient has mild tenderness on palpation, however states could be secondary to her sleeping position.  Advised to switch to sleeping position and take as needed ibuprofen.  No signs or symptoms of infections.  -Blood pressure is elevated today at 149/107, patient takes clonidine 0.2 mg twice daily.  Patient states she has not taken her blood pressure medication today and that is why her blood pressure is high otherwise her systolic blood pressure is in 120s to 130s at home.  Patient recommended to keep blood pressure log at home and call us back in a week.  May need to start on chlorthalidone 25 mg daily if her blood pressure keeps on staying high.  -Can follow-up in 6 months or sooner as needed.    Patient was educated on heart healthy diet, daily exercise and achieving optimal weight.     Follow Up     Return in about 6 months (around 5/5/2025) for With Chetna Cameron.      Corinne Theodore [facility]  reports that she has been smoking cigarettes. She started smoking about 10 years ago. She has a 5.2 pack-year smoking history. She has never used smokeless tobacco. I have educated her on the risk of diseases from using tobacco products such as cancer, COPD, and heart disease.              I spent 30 minutes caring for this patient on this date of service. This time includes time spent by me in the following activities:preparing for the visit, reviewing tests, obtaining and/or reviewing a separately obtained history, performing a medically appropriate examination and/or evaluation , counseling and  educating the patient/family/caregiver, ordering medications, tests, or procedures, referring and communicating with other health care professionals , documenting information in the medical record, independently interpreting results and communicating that information with the patient/family/caregiver, and care coordination.    I have reviewed the family history, social history, and past medical history for this patient. Previous information and data has been reviewed and updated as needed. I have reviewed and verified the chief complaint, history, and other documentation. The patient was interviewed and examined in the clinic and the chart reviewed. The previous observations, recommendations, and conclusions were reviewed including those of other providers.     The plan was discussed with the patient and/or family. The patient was given time to ask questions and these questions were answered. At the conclusion of their visit they had no additional questions or concerns and all questions were answered to their satisfaction.     Patient was given instructions and counseling regarding her condition or for health maintenance advice. Please see specific information pulled into the AVS if appropriate.      Beltran Raya MD, FACC  11/05/24  12:38 EST    Dictated Utilizing Dragon Dictation

## 2024-11-05 NOTE — LETTER
November 5, 2024     VALERIY Dawson  [facility]  [place] [address]    Patient: Corinne Del Torogerson   YOB: 1985   Date of Visit: 11/5/2024       Dear VALERIY Dawson,    Corinne Small was in my office today. Below are the relevant portions of my assessment and plan of care.           If you have questions, please do not hesitate to call me. I look forward to following Corinne along with you.         Sincerely,        Beltran Raya MD        CC: No Recipients

## 2024-11-09 ENCOUNTER — APPOINTMENT (OUTPATIENT)
Dept: CT IMAGING | Facility: HOSPITAL | Age: 39
End: 2024-11-09
Payer: COMMERCIAL

## 2024-11-09 ENCOUNTER — APPOINTMENT (OUTPATIENT)
Dept: GENERAL RADIOLOGY | Facility: HOSPITAL | Age: 39
End: 2024-11-09
Payer: COMMERCIAL

## 2024-11-09 ENCOUNTER — APPOINTMENT (OUTPATIENT)
Dept: MRI IMAGING | Facility: HOSPITAL | Age: 39
End: 2024-11-09
Payer: COMMERCIAL

## 2024-11-09 ENCOUNTER — HOSPITAL ENCOUNTER (EMERGENCY)
Facility: HOSPITAL | Age: 39
Discharge: HOME | End: 2024-11-09
Attending: EMERGENCY MEDICINE
Payer: COMMERCIAL

## 2024-11-09 VITALS
RESPIRATION RATE: 21 BRPM | DIASTOLIC BLOOD PRESSURE: 79 MMHG | HEIGHT: 64 IN | WEIGHT: 263.23 LBS | BODY MASS INDEX: 44.94 KG/M2 | OXYGEN SATURATION: 100 % | TEMPERATURE: 98 F | SYSTOLIC BLOOD PRESSURE: 144 MMHG | HEART RATE: 70 BPM

## 2024-11-09 DIAGNOSIS — R20.0 LEFT FACIAL NUMBNESS: Primary | ICD-10-CM

## 2024-11-09 DIAGNOSIS — R07.9 NONSPECIFIC CHEST PAIN: ICD-10-CM

## 2024-11-09 LAB
ALBUMIN SERPL-MCNC: 4 G/DL (ref 3.5–5.2)
ALBUMIN/GLOB SERPL: 1.3 G/DL
ALP SERPL-CCNC: 84 U/L (ref 39–117)
ALT SERPL W P-5'-P-CCNC: 20 U/L (ref 1–33)
ANION GAP SERPL CALCULATED.3IONS-SCNC: 7.6 MMOL/L (ref 5–15)
AST SERPL-CCNC: 16 U/L (ref 1–32)
BASOPHILS # BLD AUTO: 0.07 10*3/MM3 (ref 0–0.2)
BASOPHILS NFR BLD AUTO: 0.7 % (ref 0–1.5)
BILIRUB SERPL-MCNC: <0.2 MG/DL (ref 0–1.2)
BUN SERPL-MCNC: 10 MG/DL (ref 6–20)
BUN/CREAT SERPL: 13.3 (ref 7–25)
CALCIUM SPEC-SCNC: 8.9 MG/DL (ref 8.6–10.5)
CHLORIDE SERPL-SCNC: 110 MMOL/L (ref 98–107)
CO2 SERPL-SCNC: 21.4 MMOL/L (ref 22–29)
CREAT SERPL-MCNC: 0.75 MG/DL (ref 0.57–1)
DEPRECATED RDW RBC AUTO: 43 FL (ref 37–54)
EGFRCR SERPLBLD CKD-EPI 2021: 103.4 ML/MIN/1.73
EOSINOPHIL # BLD AUTO: 0.35 10*3/MM3 (ref 0–0.4)
EOSINOPHIL NFR BLD AUTO: 3.4 % (ref 0.3–6.2)
ERYTHROCYTE [DISTWIDTH] IN BLOOD BY AUTOMATED COUNT: 14.7 % (ref 12.3–15.4)
GEN 5 2HR TROPONIN T REFLEX: <6 NG/L
GLOBULIN UR ELPH-MCNC: 3.2 GM/DL
GLUCOSE SERPL-MCNC: 91 MG/DL (ref 65–99)
HCT VFR BLD AUTO: 37.5 % (ref 34–46.6)
HGB BLD-MCNC: 12.1 G/DL (ref 12–15.9)
HOLD SPECIMEN: NORMAL
HOLD SPECIMEN: NORMAL
IMM GRANULOCYTES # BLD AUTO: 0.05 10*3/MM3 (ref 0–0.05)
IMM GRANULOCYTES NFR BLD AUTO: 0.5 % (ref 0–0.5)
LIPASE SERPL-CCNC: 29 U/L (ref 13–60)
LYMPHOCYTES # BLD AUTO: 2.21 10*3/MM3 (ref 0.7–3.1)
LYMPHOCYTES NFR BLD AUTO: 21.5 % (ref 19.6–45.3)
MAGNESIUM SERPL-MCNC: 2.2 MG/DL (ref 1.6–2.6)
MCH RBC QN AUTO: 26 PG (ref 26.6–33)
MCHC RBC AUTO-ENTMCNC: 32.3 G/DL (ref 31.5–35.7)
MCV RBC AUTO: 80.5 FL (ref 79–97)
MONOCYTES # BLD AUTO: 0.65 10*3/MM3 (ref 0.1–0.9)
MONOCYTES NFR BLD AUTO: 6.3 % (ref 5–12)
NEUTROPHILS NFR BLD AUTO: 6.94 10*3/MM3 (ref 1.7–7)
NEUTROPHILS NFR BLD AUTO: 67.6 % (ref 42.7–76)
NRBC BLD AUTO-RTO: 0 /100 WBC (ref 0–0.2)
NT-PROBNP SERPL-MCNC: 141.5 PG/ML (ref 0–450)
PLATELET # BLD AUTO: 369 10*3/MM3 (ref 140–450)
PMV BLD AUTO: 9.6 FL (ref 6–12)
POTASSIUM SERPL-SCNC: 3.7 MMOL/L (ref 3.5–5.2)
PROT SERPL-MCNC: 7.2 G/DL (ref 6–8.5)
RBC # BLD AUTO: 4.66 10*6/MM3 (ref 3.77–5.28)
SODIUM SERPL-SCNC: 139 MMOL/L (ref 136–145)
TROPONIN T DELTA: NORMAL
TROPONIN T SERPL HS-MCNC: <6 NG/L
WBC NRBC COR # BLD AUTO: 10.27 10*3/MM3 (ref 3.4–10.8)
WHOLE BLOOD HOLD COAG: NORMAL
WHOLE BLOOD HOLD SPECIMEN: NORMAL

## 2024-11-09 PROCEDURE — 83880 ASSAY OF NATRIURETIC PEPTIDE: CPT | Performed by: EMERGENCY MEDICINE

## 2024-11-09 PROCEDURE — 0 GADOBENATE DIMEGLUMINE 529 MG/ML SOLUTION: Performed by: EMERGENCY MEDICINE

## 2024-11-09 PROCEDURE — 70553 MRI BRAIN STEM W/O & W/DYE: CPT

## 2024-11-09 PROCEDURE — 71045 X-RAY EXAM CHEST 1 VIEW: CPT

## 2024-11-09 PROCEDURE — 83690 ASSAY OF LIPASE: CPT | Performed by: EMERGENCY MEDICINE

## 2024-11-09 PROCEDURE — 99285 EMERGENCY DEPT VISIT HI MDM: CPT

## 2024-11-09 PROCEDURE — 93005 ELECTROCARDIOGRAM TRACING: CPT | Performed by: EMERGENCY MEDICINE

## 2024-11-09 PROCEDURE — A9577 INJ MULTIHANCE: HCPCS | Performed by: EMERGENCY MEDICINE

## 2024-11-09 PROCEDURE — 84484 ASSAY OF TROPONIN QUANT: CPT | Performed by: EMERGENCY MEDICINE

## 2024-11-09 PROCEDURE — 80053 COMPREHEN METABOLIC PANEL: CPT | Performed by: EMERGENCY MEDICINE

## 2024-11-09 PROCEDURE — 85025 COMPLETE CBC W/AUTO DIFF WBC: CPT | Performed by: EMERGENCY MEDICINE

## 2024-11-09 PROCEDURE — 25010000002 KETOROLAC TROMETHAMINE PER 15 MG: Performed by: EMERGENCY MEDICINE

## 2024-11-09 PROCEDURE — 36415 COLL VENOUS BLD VENIPUNCTURE: CPT

## 2024-11-09 PROCEDURE — 96374 THER/PROPH/DIAG INJ IV PUSH: CPT

## 2024-11-09 PROCEDURE — 83735 ASSAY OF MAGNESIUM: CPT | Performed by: EMERGENCY MEDICINE

## 2024-11-09 RX ORDER — SODIUM CHLORIDE 0.9 % (FLUSH) 0.9 %
10 SYRINGE (ML) INJECTION AS NEEDED
Status: DISCONTINUED | OUTPATIENT
Start: 2024-11-09 | End: 2024-11-10 | Stop reason: HOSPADM

## 2024-11-09 RX ORDER — ASPIRIN 81 MG/1
324 TABLET, CHEWABLE ORAL ONCE
Status: COMPLETED | OUTPATIENT
Start: 2024-11-09 | End: 2024-11-09

## 2024-11-09 RX ORDER — SODIUM CHLORIDE 0.9 % (FLUSH) 0.9 %
10 SYRINGE (ML) INJECTION AS NEEDED
Status: DISCONTINUED | OUTPATIENT
Start: 2024-11-09 | End: 2024-11-09

## 2024-11-09 RX ORDER — KETOROLAC TROMETHAMINE 30 MG/ML
30 INJECTION, SOLUTION INTRAMUSCULAR; INTRAVENOUS ONCE
Status: COMPLETED | OUTPATIENT
Start: 2024-11-09 | End: 2024-11-09

## 2024-11-09 RX ADMIN — GADOBENATE DIMEGLUMINE 20 ML: 529 INJECTION, SOLUTION INTRAVENOUS at 21:25

## 2024-11-09 RX ADMIN — KETOROLAC TROMETHAMINE 30 MG: 30 INJECTION, SOLUTION INTRAMUSCULAR; INTRAVENOUS at 21:48

## 2024-11-09 RX ADMIN — ASPIRIN 243 MG: 81 TABLET, CHEWABLE ORAL at 19:37
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
## 2024-11-09 NOTE — Clinical Note
[facility] EMERGENCY ROOM  [address] [place] [address]  Phone: [phone]  Fax: [phone]    Corinne Small was seen and treated in our emergency department on 11/9/2024.  She may return to work on 11/11/2024.         Thank you for choosing [facility].    Francisco Vega MD

## 2024-11-10 LAB
QT INTERVAL: 366 MS
QT INTERVAL: 409 MS
QTC INTERVAL: 443 MS
QTC INTERVAL: 446 MS

## 2024-11-11 ENCOUNTER — TELEPHONE (OUTPATIENT)
Dept: CARDIOLOGY | Facility: CLINIC | Age: 39
End: 2024-11-11

## 2024-11-11 NOTE — TELEPHONE ENCOUNTER
Caller: Corinne Small    Relationship to patient: Self    Best call back number: [phone]     Chief complaint: FACIAL NUMBNESS / PASSING OUT / CHEST PAIN    Type of visit: FOLLOW UP FROM ED VISIT    Requested date: ASAP    If rescheduling, when is the original appointment: 5.20.25    Additional notes: PATIENT WAS SEEN IN [facility] ED ON 11.9.24 FOR FACIAL NUMBNESS / PASSING OUT / CHEST PAIN AND PATIENT WAS TOLD TO FOLLOW UP WITH CARDIOLOGY. PATIENT REPORTS HIGH BLOOD PRESSURE SINCE LEAVING THE ED AND PATIENT IS TAKING REGULAR BLOOD PRESSURE MEDICATIONS.  PLEASE CALL PATIENT TO SCHEDULE FOLLOW UP.

## 2024-12-10 ENCOUNTER — APPOINTMENT (OUTPATIENT)
Dept: CT IMAGING | Facility: HOSPITAL | Age: 39
End: 2024-12-10
Payer: COMMERCIAL

## 2024-12-10 ENCOUNTER — HOSPITAL ENCOUNTER (EMERGENCY)
Facility: HOSPITAL | Age: 39
Discharge: HOME | End: 2024-12-10
Attending: EMERGENCY MEDICINE | Admitting: EMERGENCY MEDICINE
Payer: COMMERCIAL

## 2024-12-10 VITALS
BODY MASS INDEX: 44.6 KG/M2 | DIASTOLIC BLOOD PRESSURE: 90 MMHG | TEMPERATURE: 98.4 F | WEIGHT: 261.25 LBS | SYSTOLIC BLOOD PRESSURE: 132 MMHG | OXYGEN SATURATION: 98 % | HEIGHT: 64 IN | HEART RATE: 88 BPM | RESPIRATION RATE: 17 BRPM

## 2024-12-10 DIAGNOSIS — R55 SYNCOPE AND COLLAPSE: Primary | ICD-10-CM

## 2024-12-10 DIAGNOSIS — S39.012A LUMBAR STRAIN, INITIAL ENCOUNTER: ICD-10-CM

## 2024-12-10 DIAGNOSIS — S16.1XXA CERVICAL STRAIN, ACUTE, INITIAL ENCOUNTER: ICD-10-CM

## 2024-12-10 LAB
ALBUMIN SERPL-MCNC: 4 G/DL (ref 3.5–5.2)
ALBUMIN/GLOB SERPL: 1.2 G/DL
ALP SERPL-CCNC: 95 U/L (ref 39–117)
ALT SERPL W P-5'-P-CCNC: 20 U/L (ref 1–33)
ANION GAP SERPL CALCULATED.3IONS-SCNC: 9.6 MMOL/L (ref 5–15)
AST SERPL-CCNC: 17 U/L (ref 1–32)
BASOPHILS # BLD AUTO: 0.08 10*3/MM3 (ref 0–0.2)
BASOPHILS NFR BLD AUTO: 0.7 % (ref 0–1.5)
BILIRUB SERPL-MCNC: 0.3 MG/DL (ref 0–1.2)
BUN SERPL-MCNC: 13 MG/DL (ref 6–20)
BUN/CREAT SERPL: 12.6 (ref 7–25)
CALCIUM SPEC-SCNC: 9.2 MG/DL (ref 8.6–10.5)
CHLORIDE SERPL-SCNC: 110 MMOL/L (ref 98–107)
CO2 SERPL-SCNC: 19.4 MMOL/L (ref 22–29)
CREAT SERPL-MCNC: 1.03 MG/DL (ref 0.57–1)
DEPRECATED RDW RBC AUTO: 41 FL (ref 37–54)
EGFRCR SERPLBLD CKD-EPI 2021: 70.6 ML/MIN/1.73
EOSINOPHIL # BLD AUTO: 0.34 10*3/MM3 (ref 0–0.4)
EOSINOPHIL NFR BLD AUTO: 3 % (ref 0.3–6.2)
ERYTHROCYTE [DISTWIDTH] IN BLOOD BY AUTOMATED COUNT: 14.4 % (ref 12.3–15.4)
GLOBULIN UR ELPH-MCNC: 3.3 GM/DL
GLUCOSE SERPL-MCNC: 89 MG/DL (ref 65–99)
HCT VFR BLD AUTO: 39.5 % (ref 34–46.6)
HGB BLD-MCNC: 12.7 G/DL (ref 12–15.9)
HOLD SPECIMEN: NORMAL
HOLD SPECIMEN: NORMAL
IMM GRANULOCYTES # BLD AUTO: 0.04 10*3/MM3 (ref 0–0.05)
IMM GRANULOCYTES NFR BLD AUTO: 0.4 % (ref 0–0.5)
LYMPHOCYTES # BLD AUTO: 1.78 10*3/MM3 (ref 0.7–3.1)
LYMPHOCYTES NFR BLD AUTO: 15.8 % (ref 19.6–45.3)
MAGNESIUM SERPL-MCNC: 2 MG/DL (ref 1.6–2.6)
MCH RBC QN AUTO: 25.5 PG (ref 26.6–33)
MCHC RBC AUTO-ENTMCNC: 32.2 G/DL (ref 31.5–35.7)
MCV RBC AUTO: 79.2 FL (ref 79–97)
MONOCYTES # BLD AUTO: 0.64 10*3/MM3 (ref 0.1–0.9)
MONOCYTES NFR BLD AUTO: 5.7 % (ref 5–12)
NEUTROPHILS NFR BLD AUTO: 74.4 % (ref 42.7–76)
NEUTROPHILS NFR BLD AUTO: 8.42 10*3/MM3 (ref 1.7–7)
NRBC BLD AUTO-RTO: 0 /100 WBC (ref 0–0.2)
PLATELET # BLD AUTO: 450 10*3/MM3 (ref 140–450)
PMV BLD AUTO: 9.4 FL (ref 6–12)
POTASSIUM SERPL-SCNC: 3.8 MMOL/L (ref 3.5–5.2)
PROT SERPL-MCNC: 7.3 G/DL (ref 6–8.5)
QT INTERVAL: 380 MS
QTC INTERVAL: 458 MS
RBC # BLD AUTO: 4.99 10*6/MM3 (ref 3.77–5.28)
SODIUM SERPL-SCNC: 139 MMOL/L (ref 136–145)
TROPONIN T SERPL HS-MCNC: <6 NG/L
WBC NRBC COR # BLD AUTO: 11.3 10*3/MM3 (ref 3.4–10.8)
WHOLE BLOOD HOLD COAG: NORMAL
WHOLE BLOOD HOLD SPECIMEN: NORMAL

## 2024-12-10 PROCEDURE — 36415 COLL VENOUS BLD VENIPUNCTURE: CPT | Performed by: EMERGENCY MEDICINE

## 2024-12-10 PROCEDURE — 72125 CT NECK SPINE W/O DYE: CPT

## 2024-12-10 PROCEDURE — 96372 THER/PROPH/DIAG INJ SC/IM: CPT

## 2024-12-10 PROCEDURE — 85025 COMPLETE CBC W/AUTO DIFF WBC: CPT | Performed by: EMERGENCY MEDICINE

## 2024-12-10 PROCEDURE — 25010000002 KETOROLAC TROMETHAMINE PER 15 MG: Performed by: EMERGENCY MEDICINE

## 2024-12-10 PROCEDURE — 93005 ELECTROCARDIOGRAM TRACING: CPT | Performed by: EMERGENCY MEDICINE

## 2024-12-10 PROCEDURE — 83735 ASSAY OF MAGNESIUM: CPT | Performed by: EMERGENCY MEDICINE

## 2024-12-10 PROCEDURE — 99284 EMERGENCY DEPT VISIT MOD MDM: CPT

## 2024-12-10 PROCEDURE — 70450 CT HEAD/BRAIN W/O DYE: CPT

## 2024-12-10 PROCEDURE — 84484 ASSAY OF TROPONIN QUANT: CPT | Performed by: EMERGENCY MEDICINE

## 2024-12-10 PROCEDURE — 80053 COMPREHEN METABOLIC PANEL: CPT | Performed by: EMERGENCY MEDICINE

## 2024-12-10 RX ORDER — CYCLOBENZAPRINE HCL 10 MG
10 TABLET ORAL 3 TIMES DAILY PRN
Qty: 10 TABLET | Refills: 0 | Status: SHIPPED | OUTPATIENT
Start: 2024-12-10

## 2024-12-10 RX ORDER — SODIUM CHLORIDE 0.9 % (FLUSH) 0.9 %
10 SYRINGE (ML) INJECTION AS NEEDED
Status: DISCONTINUED | OUTPATIENT
Start: 2024-12-10 | End: 2024-12-10 | Stop reason: HOSPADM

## 2024-12-10 RX ORDER — KETOROLAC TROMETHAMINE 30 MG/ML
30 INJECTION, SOLUTION INTRAMUSCULAR; INTRAVENOUS ONCE
Status: COMPLETED | OUTPATIENT
Start: 2024-12-10 | End: 2024-12-10

## 2024-12-10 RX ADMIN — KETOROLAC TROMETHAMINE 30 MG: 30 INJECTION, SOLUTION INTRAMUSCULAR; INTRAVENOUS at 20:07
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
## 2024-12-10 NOTE — Clinical Note
[facility] EMERGENCY ROOM  [address] [place] [address]  Phone: [phone]  Fax: [phone]    Corinne Small was seen and treated in our emergency department on 12/10/2024.  She may return to work on 12/12/2024.  Rest for the next couple of days       Thank you for choosing [facility].    Moises Castañeda MD

## 2024-12-11 NOTE — DISCHARGE INSTRUCTIONS
Follow-up with your doctor as an outpatient.  Should you have new or worsening symptoms return to emergency room.

## 2024-12-28 LAB
QT INTERVAL: 380 MS
QTC INTERVAL: 458 MS

## 2025-01-17 ENCOUNTER — TELEPHONE (OUTPATIENT)
Dept: CARDIOLOGY | Facility: CLINIC | Age: 40
End: 2025-01-17
Payer: COMMERCIAL

## 2025-01-17 ENCOUNTER — APPOINTMENT (OUTPATIENT)
Dept: GENERAL RADIOLOGY | Facility: HOSPITAL | Age: 40
End: 2025-01-17
Payer: COMMERCIAL

## 2025-01-17 ENCOUNTER — HOSPITAL ENCOUNTER (EMERGENCY)
Facility: HOSPITAL | Age: 40
Discharge: HOME | End: 2025-01-17
Attending: EMERGENCY MEDICINE
Payer: COMMERCIAL

## 2025-01-17 VITALS
SYSTOLIC BLOOD PRESSURE: 137 MMHG | HEIGHT: 64 IN | BODY MASS INDEX: 44.38 KG/M2 | OXYGEN SATURATION: 100 % | RESPIRATION RATE: 18 BRPM | WEIGHT: 259.92 LBS | TEMPERATURE: 98.7 F | HEART RATE: 90 BPM | DIASTOLIC BLOOD PRESSURE: 84 MMHG

## 2025-01-17 DIAGNOSIS — R07.89 CHEST WALL PAIN: Primary | ICD-10-CM

## 2025-01-17 LAB
ALBUMIN SERPL-MCNC: 4.1 G/DL (ref 3.5–5.2)
ALBUMIN/GLOB SERPL: 1.2 G/DL
ALP SERPL-CCNC: 86 U/L (ref 39–117)
ALT SERPL W P-5'-P-CCNC: 18 U/L (ref 1–33)
ANION GAP SERPL CALCULATED.3IONS-SCNC: 12.8 MMOL/L (ref 5–15)
AST SERPL-CCNC: 16 U/L (ref 1–32)
BASOPHILS # BLD AUTO: 0.06 10*3/MM3 (ref 0–0.2)
BASOPHILS NFR BLD AUTO: 0.7 % (ref 0–1.5)
BILIRUB SERPL-MCNC: 0.2 MG/DL (ref 0–1.2)
BUN SERPL-MCNC: 11 MG/DL (ref 6–20)
BUN/CREAT SERPL: 15.3 (ref 7–25)
CALCIUM SPEC-SCNC: 8.6 MG/DL (ref 8.6–10.5)
CHLORIDE SERPL-SCNC: 109 MMOL/L (ref 98–107)
CO2 SERPL-SCNC: 18.2 MMOL/L (ref 22–29)
CREAT SERPL-MCNC: 0.72 MG/DL (ref 0.57–1)
DEPRECATED RDW RBC AUTO: 43.7 FL (ref 37–54)
EGFRCR SERPLBLD CKD-EPI 2021: 108.6 ML/MIN/1.73
EOSINOPHIL # BLD AUTO: 0.33 10*3/MM3 (ref 0–0.4)
EOSINOPHIL NFR BLD AUTO: 3.6 % (ref 0.3–6.2)
ERYTHROCYTE [DISTWIDTH] IN BLOOD BY AUTOMATED COUNT: 15.3 % (ref 12.3–15.4)
GEN 5 1HR TROPONIN T REFLEX: <6 NG/L
GLOBULIN UR ELPH-MCNC: 3.5 GM/DL
GLUCOSE SERPL-MCNC: 122 MG/DL (ref 65–99)
HCT VFR BLD AUTO: 39 % (ref 34–46.6)
HGB BLD-MCNC: 12.5 G/DL (ref 12–15.9)
HOLD SPECIMEN: NORMAL
HOLD SPECIMEN: NORMAL
IMM GRANULOCYTES # BLD AUTO: 0.03 10*3/MM3 (ref 0–0.05)
IMM GRANULOCYTES NFR BLD AUTO: 0.3 % (ref 0–0.5)
LIPASE SERPL-CCNC: 41 U/L (ref 13–60)
LYMPHOCYTES # BLD AUTO: 2.55 10*3/MM3 (ref 0.7–3.1)
LYMPHOCYTES NFR BLD AUTO: 27.8 % (ref 19.6–45.3)
MAGNESIUM SERPL-MCNC: 2 MG/DL (ref 1.6–2.6)
MCH RBC QN AUTO: 25.5 PG (ref 26.6–33)
MCHC RBC AUTO-ENTMCNC: 32.1 G/DL (ref 31.5–35.7)
MCV RBC AUTO: 79.4 FL (ref 79–97)
MONOCYTES # BLD AUTO: 0.59 10*3/MM3 (ref 0.1–0.9)
MONOCYTES NFR BLD AUTO: 6.4 % (ref 5–12)
NEUTROPHILS NFR BLD AUTO: 5.61 10*3/MM3 (ref 1.7–7)
NEUTROPHILS NFR BLD AUTO: 61.2 % (ref 42.7–76)
NRBC BLD AUTO-RTO: 0 /100 WBC (ref 0–0.2)
NT-PROBNP SERPL-MCNC: 99.3 PG/ML (ref 0–450)
PLATELET # BLD AUTO: 370 10*3/MM3 (ref 140–450)
PMV BLD AUTO: 9.9 FL (ref 6–12)
POTASSIUM SERPL-SCNC: 3.8 MMOL/L (ref 3.5–5.2)
PROT SERPL-MCNC: 7.6 G/DL (ref 6–8.5)
QT INTERVAL: 385 MS
QTC INTERVAL: 483 MS
RBC # BLD AUTO: 4.91 10*6/MM3 (ref 3.77–5.28)
SODIUM SERPL-SCNC: 140 MMOL/L (ref 136–145)
TROPONIN T NUMERIC DELTA: NORMAL
TROPONIN T SERPL HS-MCNC: <6 NG/L
WBC NRBC COR # BLD AUTO: 9.17 10*3/MM3 (ref 3.4–10.8)
WHOLE BLOOD HOLD COAG: NORMAL
WHOLE BLOOD HOLD SPECIMEN: NORMAL

## 2025-01-17 PROCEDURE — 83690 ASSAY OF LIPASE: CPT

## 2025-01-17 PROCEDURE — 85025 COMPLETE CBC W/AUTO DIFF WBC: CPT

## 2025-01-17 PROCEDURE — 83880 ASSAY OF NATRIURETIC PEPTIDE: CPT

## 2025-01-17 PROCEDURE — 99284 EMERGENCY DEPT VISIT MOD MDM: CPT

## 2025-01-17 PROCEDURE — 84484 ASSAY OF TROPONIN QUANT: CPT

## 2025-01-17 PROCEDURE — 80053 COMPREHEN METABOLIC PANEL: CPT

## 2025-01-17 PROCEDURE — 83735 ASSAY OF MAGNESIUM: CPT

## 2025-01-17 PROCEDURE — 71045 X-RAY EXAM CHEST 1 VIEW: CPT

## 2025-01-17 PROCEDURE — 93005 ELECTROCARDIOGRAM TRACING: CPT | Performed by: EMERGENCY MEDICINE

## 2025-01-17 PROCEDURE — 93005 ELECTROCARDIOGRAM TRACING: CPT

## 2025-01-17 PROCEDURE — 36415 COLL VENOUS BLD VENIPUNCTURE: CPT

## 2025-01-17 PROCEDURE — 25010000002 KETOROLAC TROMETHAMINE PER 15 MG: Performed by: EMERGENCY MEDICINE

## 2025-01-17 PROCEDURE — 96374 THER/PROPH/DIAG INJ IV PUSH: CPT

## 2025-01-17 RX ORDER — KETOROLAC TROMETHAMINE 30 MG/ML
30 INJECTION, SOLUTION INTRAMUSCULAR; INTRAVENOUS ONCE
Status: COMPLETED | OUTPATIENT
Start: 2025-01-17 | End: 2025-01-17

## 2025-01-17 RX ORDER — SODIUM CHLORIDE 0.9 % (FLUSH) 0.9 %
10 SYRINGE (ML) INJECTION AS NEEDED
Status: DISCONTINUED | OUTPATIENT
Start: 2025-01-17 | End: 2025-01-17 | Stop reason: HOSPADM

## 2025-01-17 RX ORDER — ASPIRIN 81 MG/1
324 TABLET, CHEWABLE ORAL ONCE
Status: DISCONTINUED | OUTPATIENT
Start: 2025-01-17 | End: 2025-01-17 | Stop reason: HOSPADM

## 2025-01-17 RX ORDER — DICLOFENAC SODIUM 75 MG/1
75 TABLET, DELAYED RELEASE ORAL 2 TIMES DAILY PRN
Qty: 20 TABLET | Refills: 0 | Status: SHIPPED | OUTPATIENT
Start: 2025-01-17

## 2025-01-17 RX ADMIN — KETOROLAC TROMETHAMINE 30 MG: 30 INJECTION, SOLUTION INTRAMUSCULAR; INTRAVENOUS at 16:38

## 2025-01-17 NOTE — DISCHARGE INSTRUCTIONS
Monitor your blood pressure at home.  Follow-up with your cardiologist for any additional medication changes to your blood pressure regimen if your blood pressure is running high.  Continue your other home medications as prescribed.  Follow your primary care provider in 1 week.  Return to the ER for any other concerns issues that may arise.
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
## 2025-01-17 NOTE — TELEPHONE ENCOUNTER
DARVIN patient. Patient reports having chest pain and  blood pressure 160/121 after taking medication. States blood pressure 120/96 yesterday. Compliant with clonidine 0.2 mg twice a day.    Advised to go to ER for chest pain and uncontrolled blood pressure.

## 2025-01-20 NOTE — TELEPHONE ENCOUNTER
It appears patient was seen in the ER.  Patient's cardiac workup was normal.    Have the patient check her blood pressure midday for the next week and send in a log.  Can make further adjustments from there if necessary.

## 2025-01-21 RX ORDER — CLONIDINE HYDROCHLORIDE 0.2 MG/1
0.2 TABLET ORAL 2 TIMES DAILY
Qty: 60 TABLET | Refills: 3 | Status: SHIPPED | OUTPATIENT
Start: 2025-01-21

## 2025-01-21 RX ORDER — CHLORTHALIDONE 25 MG/1
25 TABLET ORAL DAILY
Qty: 30 TABLET | Refills: 3 | Status: SHIPPED | OUTPATIENT
Start: 2025-01-21 | End: 2025-01-24

## 2025-01-23 ENCOUNTER — TELEPHONE (OUTPATIENT)
Dept: CARDIOLOGY | Facility: CLINIC | Age: 40
End: 2025-01-23
Payer: COMMERCIAL

## 2025-01-23 NOTE — TELEPHONE ENCOUNTER
DARVIN patient. Patient reports has not felt well since starting chlorthalidone 25 mg daily on 1/21. States she has chest pain that comes and goes, dizzy, with headache. Reports today in the shower a large clump of hair fell out. Reports she is stopping chlorthalidone.    BP   1/22: 140/106  145/108  1/23: 115/103  131/108    Advised to monitor blood pressure twice a day and bring to appointment on 1/28. Advised to go to ER for any chest pain or stroke like symptoms.

## 2025-01-24 RX ORDER — CARVEDILOL 6.25 MG/1
6.25 TABLET ORAL 2 TIMES DAILY
Qty: 180 TABLET | Refills: 3 | Status: SHIPPED | OUTPATIENT
Start: 2025-01-24 | End: 2025-01-27 | Stop reason: SDUPTHER

## 2025-01-27 RX ORDER — CARVEDILOL 6.25 MG/1
6.25 TABLET ORAL 2 TIMES DAILY
Qty: 180 TABLET | Refills: 3 | Status: SHIPPED | OUTPATIENT
Start: 2025-01-27

## 2025-01-30 ENCOUNTER — OFFICE VISIT (OUTPATIENT)
Dept: CARDIOLOGY | Facility: CLINIC | Age: 40
End: 2025-01-30
Payer: COMMERCIAL

## 2025-01-30 VITALS
BODY MASS INDEX: 44.63 KG/M2 | WEIGHT: 261.4 LBS | SYSTOLIC BLOOD PRESSURE: 119 MMHG | DIASTOLIC BLOOD PRESSURE: 81 MMHG | HEART RATE: 74 BPM | OXYGEN SATURATION: 98 % | HEIGHT: 64 IN

## 2025-01-30 DIAGNOSIS — R55 SYNCOPE AND COLLAPSE: ICD-10-CM

## 2025-01-30 DIAGNOSIS — Z95.818 STATUS POST PLACEMENT OF IMPLANTABLE LOOP RECORDER: ICD-10-CM

## 2025-01-30 DIAGNOSIS — I10 ESSENTIAL HYPERTENSION: Primary | ICD-10-CM

## 2025-01-30 PROCEDURE — 3074F SYST BP LT 130 MM HG: CPT | Performed by: NURSE PRACTITIONER

## 2025-01-30 PROCEDURE — 99214 OFFICE O/P EST MOD 30 MIN: CPT | Performed by: NURSE PRACTITIONER

## 2025-01-30 PROCEDURE — 1159F MED LIST DOCD IN RCRD: CPT | Performed by: NURSE PRACTITIONER

## 2025-01-30 PROCEDURE — 3079F DIAST BP 80-89 MM HG: CPT | Performed by: NURSE PRACTITIONER

## 2025-01-30 PROCEDURE — 1160F RVW MEDS BY RX/DR IN RCRD: CPT | Performed by: NURSE PRACTITIONER

## 2025-01-30 RX ORDER — FLUCONAZOLE 150 MG/1
150 TABLET ORAL TAKE AS DIRECTED
Qty: 2 TABLET | Refills: 0 | Status: SHIPPED | OUTPATIENT
Start: 2025-01-30
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
## 2025-02-11 LAB
QT INTERVAL: 385 MS
QTC INTERVAL: 483 MS

## 2025-02-27 ENCOUNTER — APPOINTMENT (OUTPATIENT)
Dept: GENERAL RADIOLOGY | Facility: HOSPITAL | Age: 40
End: 2025-02-27
Payer: COMMERCIAL

## 2025-02-27 ENCOUNTER — HOSPITAL ENCOUNTER (EMERGENCY)
Facility: HOSPITAL | Age: 40
Discharge: HOME | End: 2025-02-28
Attending: EMERGENCY MEDICINE
Payer: COMMERCIAL

## 2025-02-27 ENCOUNTER — APPOINTMENT (OUTPATIENT)
Dept: CT IMAGING | Facility: HOSPITAL | Age: 40
End: 2025-02-27
Payer: COMMERCIAL

## 2025-02-27 DIAGNOSIS — I10 UNCONTROLLED HYPERTENSION: Primary | ICD-10-CM

## 2025-02-27 LAB
ALBUMIN SERPL-MCNC: 4 G/DL (ref 3.5–5.2)
ALBUMIN/GLOB SERPL: 1.1 G/DL
ALP SERPL-CCNC: 94 U/L (ref 39–117)
ALT SERPL W P-5'-P-CCNC: 20 U/L (ref 1–33)
ANION GAP SERPL CALCULATED.3IONS-SCNC: 11.9 MMOL/L (ref 5–15)
AST SERPL-CCNC: 17 U/L (ref 1–32)
BACTERIA UR QL AUTO: ABNORMAL /HPF
BASOPHILS # BLD AUTO: 0.06 10*3/MM3 (ref 0–0.2)
BASOPHILS NFR BLD AUTO: 0.5 % (ref 0–1.5)
BILIRUB SERPL-MCNC: <0.2 MG/DL (ref 0–1.2)
BILIRUB UR QL STRIP: NEGATIVE
BUN SERPL-MCNC: 14 MG/DL (ref 6–20)
BUN/CREAT SERPL: 13.2 (ref 7–25)
CALCIUM SPEC-SCNC: 9.6 MG/DL (ref 8.6–10.5)
CHLORIDE SERPL-SCNC: 107 MMOL/L (ref 98–107)
CLARITY UR: ABNORMAL
CO2 SERPL-SCNC: 17.1 MMOL/L (ref 22–29)
COLOR UR: YELLOW
CREAT SERPL-MCNC: 1.06 MG/DL (ref 0.57–1)
DEPRECATED RDW RBC AUTO: 43.1 FL (ref 37–54)
EGFRCR SERPLBLD CKD-EPI 2021: 68.2 ML/MIN/1.73
EOSINOPHIL # BLD AUTO: 0.01 10*3/MM3 (ref 0–0.4)
EOSINOPHIL NFR BLD AUTO: 0.1 % (ref 0.3–6.2)
ERYTHROCYTE [DISTWIDTH] IN BLOOD BY AUTOMATED COUNT: 15.1 % (ref 12.3–15.4)
FLUAV SUBTYP SPEC NAA+PROBE: NOT DETECTED
FLUBV RNA ISLT QL NAA+PROBE: NOT DETECTED
GEN 5 1HR TROPONIN T REFLEX: <6 NG/L
GLOBULIN UR ELPH-MCNC: 3.8 GM/DL
GLUCOSE SERPL-MCNC: 149 MG/DL (ref 65–99)
GLUCOSE UR STRIP-MCNC: NEGATIVE MG/DL
HCT VFR BLD AUTO: 39.6 % (ref 34–46.6)
HGB BLD-MCNC: 12.5 G/DL (ref 12–15.9)
HGB UR QL STRIP.AUTO: ABNORMAL
HOLD SPECIMEN: NORMAL
HOLD SPECIMEN: NORMAL
HYALINE CASTS UR QL AUTO: ABNORMAL /LPF
IMM GRANULOCYTES # BLD AUTO: 0.06 10*3/MM3 (ref 0–0.05)
IMM GRANULOCYTES NFR BLD AUTO: 0.5 % (ref 0–0.5)
KETONES UR QL STRIP: ABNORMAL
LEUKOCYTE ESTERASE UR QL STRIP.AUTO: NEGATIVE
LYMPHOCYTES # BLD AUTO: 1.11 10*3/MM3 (ref 0.7–3.1)
LYMPHOCYTES NFR BLD AUTO: 8.3 % (ref 19.6–45.3)
MAGNESIUM SERPL-MCNC: 2.1 MG/DL (ref 1.6–2.6)
MCH RBC QN AUTO: 25.1 PG (ref 26.6–33)
MCHC RBC AUTO-ENTMCNC: 31.6 G/DL (ref 31.5–35.7)
MCV RBC AUTO: 79.4 FL (ref 79–97)
MONOCYTES # BLD AUTO: 0.11 10*3/MM3 (ref 0.1–0.9)
MONOCYTES NFR BLD AUTO: 0.8 % (ref 5–12)
NEUTROPHILS NFR BLD AUTO: 11.96 10*3/MM3 (ref 1.7–7)
NEUTROPHILS NFR BLD AUTO: 89.8 % (ref 42.7–76)
NITRITE UR QL STRIP: NEGATIVE
NRBC BLD AUTO-RTO: 0 /100 WBC (ref 0–0.2)
PH UR STRIP.AUTO: 7 [PH] (ref 5–8)
PLATELET # BLD AUTO: 421 10*3/MM3 (ref 140–450)
PMV BLD AUTO: 9.3 FL (ref 6–12)
POTASSIUM SERPL-SCNC: 4.5 MMOL/L (ref 3.5–5.2)
PROT SERPL-MCNC: 7.8 G/DL (ref 6–8.5)
PROT UR QL STRIP: ABNORMAL
QT INTERVAL: 397 MS
QTC INTERVAL: 459 MS
RBC # BLD AUTO: 4.99 10*6/MM3 (ref 3.77–5.28)
RBC # UR STRIP: ABNORMAL /HPF
REF LAB TEST METHOD: ABNORMAL
RSV RNA NPH QL NAA+NON-PROBE: NOT DETECTED
SARS-COV-2 RNA RESP QL NAA+PROBE: NOT DETECTED
SODIUM SERPL-SCNC: 136 MMOL/L (ref 136–145)
SP GR UR STRIP: 1.03 (ref 1–1.03)
SQUAMOUS #/AREA URNS HPF: ABNORMAL /HPF
T4 FREE SERPL-MCNC: 0.95 NG/DL (ref 0.92–1.68)
TROPONIN T NUMERIC DELTA: NORMAL
TROPONIN T SERPL HS-MCNC: <6 NG/L
TSH SERPL DL<=0.05 MIU/L-ACNC: 0.8 UIU/ML (ref 0.27–4.2)
UROBILINOGEN UR QL STRIP: ABNORMAL
WBC # UR STRIP: ABNORMAL /HPF
WBC NRBC COR # BLD AUTO: 13.31 10*3/MM3 (ref 3.4–10.8)
WHOLE BLOOD HOLD COAG: NORMAL
WHOLE BLOOD HOLD SPECIMEN: NORMAL

## 2025-02-27 PROCEDURE — 96374 THER/PROPH/DIAG INJ IV PUSH: CPT

## 2025-02-27 PROCEDURE — 84484 ASSAY OF TROPONIN QUANT: CPT | Performed by: EMERGENCY MEDICINE

## 2025-02-27 PROCEDURE — 87637 SARSCOV2&INF A&B&RSV AMP PRB: CPT | Performed by: EMERGENCY MEDICINE

## 2025-02-27 PROCEDURE — 81001 URINALYSIS AUTO W/SCOPE: CPT | Performed by: EMERGENCY MEDICINE

## 2025-02-27 PROCEDURE — 85025 COMPLETE CBC W/AUTO DIFF WBC: CPT

## 2025-02-27 PROCEDURE — 84484 ASSAY OF TROPONIN QUANT: CPT

## 2025-02-27 PROCEDURE — 80053 COMPREHEN METABOLIC PANEL: CPT

## 2025-02-27 PROCEDURE — 99284 EMERGENCY DEPT VISIT MOD MDM: CPT

## 2025-02-27 PROCEDURE — 83735 ASSAY OF MAGNESIUM: CPT

## 2025-02-27 PROCEDURE — 71045 X-RAY EXAM CHEST 1 VIEW: CPT

## 2025-02-27 PROCEDURE — 93005 ELECTROCARDIOGRAM TRACING: CPT | Performed by: EMERGENCY MEDICINE

## 2025-02-27 PROCEDURE — 84439 ASSAY OF FREE THYROXINE: CPT | Performed by: EMERGENCY MEDICINE

## 2025-02-27 PROCEDURE — 25010000002 LABETALOL 5 MG/ML SOLUTION: Performed by: EMERGENCY MEDICINE

## 2025-02-27 PROCEDURE — 84443 ASSAY THYROID STIM HORMONE: CPT | Performed by: EMERGENCY MEDICINE

## 2025-02-27 PROCEDURE — 36415 COLL VENOUS BLD VENIPUNCTURE: CPT

## 2025-02-27 PROCEDURE — 93005 ELECTROCARDIOGRAM TRACING: CPT

## 2025-02-27 PROCEDURE — 70450 CT HEAD/BRAIN W/O DYE: CPT

## 2025-02-27 RX ORDER — LABETALOL HYDROCHLORIDE 5 MG/ML
20 INJECTION, SOLUTION INTRAVENOUS ONCE
Status: COMPLETED | OUTPATIENT
Start: 2025-02-27 | End: 2025-02-27

## 2025-02-27 RX ORDER — SODIUM CHLORIDE 0.9 % (FLUSH) 0.9 %
10 SYRINGE (ML) INJECTION AS NEEDED
Status: DISCONTINUED | OUTPATIENT
Start: 2025-02-27 | End: 2025-02-28 | Stop reason: HOSPADM

## 2025-02-27 RX ADMIN — LABETALOL HYDROCHLORIDE 20 MG: 5 INJECTION, SOLUTION INTRAVENOUS at 22:27

## 2025-02-27 NOTE — Clinical Note
[facility] EMERGENCY ROOM  [address] [place] [address]  Phone: [phone]  Fax: [phone]    Álvaro Mai accompanied Corinne Small to the emergency department on 2/27/2025. They may return to work on 03/01/2025.        Thank you for choosing [facility].    Minesh Rosa RN

## 2025-02-28 ENCOUNTER — HOSPITAL ENCOUNTER (OUTPATIENT)
Dept: CARDIOLOGY | Facility: HOSPITAL | Age: 40
Discharge: HOME | End: 2025-02-28
Payer: COMMERCIAL

## 2025-02-28 VITALS
HEIGHT: 64 IN | HEART RATE: 93 BPM | RESPIRATION RATE: 20 BRPM | DIASTOLIC BLOOD PRESSURE: 78 MMHG | BODY MASS INDEX: 44.98 KG/M2 | OXYGEN SATURATION: 97 % | TEMPERATURE: 98.5 F | WEIGHT: 263.45 LBS | SYSTOLIC BLOOD PRESSURE: 130 MMHG

## 2025-02-28 DIAGNOSIS — R55 SYNCOPE AND COLLAPSE: ICD-10-CM

## 2025-02-28 PROCEDURE — 93660 TILT TABLE EVALUATION: CPT

## 2025-02-28 NOTE — DISCHARGE INSTRUCTIONS
Cardiovascular Services Phone Number: [phone]    Normal activities may be resumed after you are released from the hospital.  Normal consumption of foods and liquids may be resumed immediately after the study.  Contact your regular physician for further evaluation if your symptoms occur again.  Contact your physician who directed your tilt table study if you experience any symptoms again.  Resume your preventative medications, following your doctor's instructions.  Notify your doctor if you experience your symptoms while taking your medications  Do not stop taking your medication without notifying your doctor.      In the event of an emergency, call 911 or go to your nearest emergency room.

## 2025-02-28 NOTE — DISCHARGE INSTRUCTIONS
Please go to your tilt table test as scheduled today    Please return to the emergency room immediately for intractable headache, loss of visual field, difficulties with balance or walking, difficulties with coordination, new numbness or weakness in your arms or legs, difficulty with speech, swallowing, chest pain, chest pressure or shortness of breath, unusual fatigue, unusual sweating or any new symptoms you are concerned with    Please check your blood pressure twice a day, record and discuss those readings with your primary care physician.  Please restart your blood pressure medication after your tilt table test this morning
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
## 2025-03-11 LAB
QT INTERVAL: 397 MS
QTC INTERVAL: 459 MS

## 2025-04-03 ENCOUNTER — HOSPITAL ENCOUNTER (EMERGENCY)
Facility: HOSPITAL | Age: 40
Discharge: HOME | End: 2025-04-03
Attending: EMERGENCY MEDICINE
Payer: COMMERCIAL

## 2025-04-03 ENCOUNTER — APPOINTMENT (OUTPATIENT)
Dept: GENERAL RADIOLOGY | Facility: HOSPITAL | Age: 40
End: 2025-04-03
Payer: COMMERCIAL

## 2025-04-03 VITALS
HEIGHT: 64 IN | WEIGHT: 267.64 LBS | BODY MASS INDEX: 45.69 KG/M2 | HEART RATE: 83 BPM | OXYGEN SATURATION: 100 % | SYSTOLIC BLOOD PRESSURE: 153 MMHG | RESPIRATION RATE: 18 BRPM | DIASTOLIC BLOOD PRESSURE: 99 MMHG | TEMPERATURE: 98.4 F

## 2025-04-03 DIAGNOSIS — R07.89 CHEST WALL PAIN: ICD-10-CM
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
## 2025-04-03 DIAGNOSIS — S13.9XXA NECK SPRAIN, INITIAL ENCOUNTER: ICD-10-CM

## 2025-04-03 PROCEDURE — 99283 EMERGENCY DEPT VISIT LOW MDM: CPT

## 2025-04-03 PROCEDURE — 72040 X-RAY EXAM NECK SPINE 2-3 VW: CPT

## 2025-04-03 PROCEDURE — 71045 X-RAY EXAM CHEST 1 VIEW: CPT

## 2025-04-03 RX ORDER — IBUPROFEN 400 MG/1
800 TABLET, FILM COATED ORAL ONCE
Status: COMPLETED | OUTPATIENT
Start: 2025-04-03 | End: 2025-04-03

## 2025-04-03 RX ORDER — CYCLOBENZAPRINE HCL 10 MG
10 TABLET ORAL ONCE
Status: COMPLETED | OUTPATIENT
Start: 2025-04-03 | End: 2025-04-03

## 2025-04-03 RX ORDER — ORPHENADRINE CITRATE 100 MG/1
100 TABLET ORAL 2 TIMES DAILY
Qty: 20 TABLET | Refills: 0 | Status: SHIPPED | OUTPATIENT
Start: 2025-04-03

## 2025-04-03 RX ADMIN — IBUPROFEN 800 MG: 400 TABLET, FILM COATED ORAL at 22:29

## 2025-04-03 RX ADMIN — CYCLOBENZAPRINE HYDROCHLORIDE 10 MG: 10 TABLET, FILM COATED ORAL at 22:29

## 2025-04-03 NOTE — Clinical Note
[facility] EMERGENCY ROOM  [address] [place] [address]  Phone: [phone]  Fax: [phone]    Corinne Small was seen and treated in our emergency department on 4/3/2025.  She may return to work on 04/07/2025.         Thank you for choosing [facility].    Joanna Tejeda RN

## 2025-04-04 NOTE — DISCHARGE INSTRUCTIONS
Your x-rays are negative for any fractures or dislocations.  You will be more sore tomorrow and Saturday, have sent muscle relaxers to pharmacy.  Please alternate Tylenol Motrin as needed for pain.
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
## (undated) DEVICE — SUT ETHLN 3/0 KS 30IN 627H